# Patient Record
Sex: MALE | Race: OTHER | HISPANIC OR LATINO | URBAN - METROPOLITAN AREA
[De-identification: names, ages, dates, MRNs, and addresses within clinical notes are randomized per-mention and may not be internally consistent; named-entity substitution may affect disease eponyms.]

---

## 2023-01-01 ENCOUNTER — EMERGENCY (EMERGENCY)
Facility: HOSPITAL | Age: 88
LOS: 1 days | Discharge: HOME | End: 2023-01-01
Attending: EMERGENCY MEDICINE
Payer: MEDICARE

## 2023-01-01 VITALS
RESPIRATION RATE: 18 BRPM | DIASTOLIC BLOOD PRESSURE: 71 MMHG | TEMPERATURE: 98 F | SYSTOLIC BLOOD PRESSURE: 123 MMHG | HEART RATE: 56 BPM | OXYGEN SATURATION: 97 %

## 2023-01-01 VITALS
OXYGEN SATURATION: 96 % | DIASTOLIC BLOOD PRESSURE: 80 MMHG | HEIGHT: 68 IN | RESPIRATION RATE: 20 BRPM | WEIGHT: 169.98 LBS | HEART RATE: 96 BPM | SYSTOLIC BLOOD PRESSURE: 159 MMHG | TEMPERATURE: 96 F

## 2023-01-01 DIAGNOSIS — Z98.890 OTHER SPECIFIED POSTPROCEDURAL STATES: Chronic | ICD-10-CM

## 2023-01-01 DIAGNOSIS — Z95.0 PRESENCE OF CARDIAC PACEMAKER: Chronic | ICD-10-CM

## 2023-01-01 DIAGNOSIS — Z95.2 PRESENCE OF PROSTHETIC HEART VALVE: Chronic | ICD-10-CM

## 2023-01-01 LAB
ALBUMIN SERPL ELPH-MCNC: 3.6 G/DL — SIGNIFICANT CHANGE UP (ref 3.3–5)
ALP SERPL-CCNC: 217 U/L — HIGH (ref 40–120)
ALT FLD-CCNC: 41 U/L — SIGNIFICANT CHANGE UP (ref 10–45)
ANION GAP SERPL CALC-SCNC: 11 MMOL/L — SIGNIFICANT CHANGE UP (ref 5–17)
APTT BLD: 42.7 SEC — HIGH (ref 24.5–35.6)
AST SERPL-CCNC: 46 U/L — HIGH (ref 10–40)
BASOPHILS # BLD AUTO: 0.01 K/UL — SIGNIFICANT CHANGE UP (ref 0–0.2)
BASOPHILS NFR BLD AUTO: 0.1 % — SIGNIFICANT CHANGE UP (ref 0–2)
BILIRUB SERPL-MCNC: 0.9 MG/DL — SIGNIFICANT CHANGE UP (ref 0.2–1.2)
BUN SERPL-MCNC: 25 MG/DL — HIGH (ref 7–23)
CALCIUM SERPL-MCNC: 9 MG/DL — SIGNIFICANT CHANGE UP (ref 8.4–10.5)
CHLORIDE SERPL-SCNC: 103 MMOL/L — SIGNIFICANT CHANGE UP (ref 96–108)
CO2 SERPL-SCNC: 26 MMOL/L — SIGNIFICANT CHANGE UP (ref 22–31)
CREAT SERPL-MCNC: 1.05 MG/DL — SIGNIFICANT CHANGE UP (ref 0.5–1.3)
EGFR: 64 ML/MIN/1.73M2 — SIGNIFICANT CHANGE UP
EOSINOPHIL # BLD AUTO: 0.02 K/UL — SIGNIFICANT CHANGE UP (ref 0–0.5)
EOSINOPHIL NFR BLD AUTO: 0.3 % — SIGNIFICANT CHANGE UP (ref 0–6)
GLUCOSE SERPL-MCNC: 97 MG/DL — SIGNIFICANT CHANGE UP (ref 70–99)
HCT VFR BLD CALC: 41.8 % — SIGNIFICANT CHANGE UP (ref 39–50)
HGB BLD-MCNC: 13.6 G/DL — SIGNIFICANT CHANGE UP (ref 13–17)
IMM GRANULOCYTES NFR BLD AUTO: 1.2 % — HIGH (ref 0–0.9)
INR BLD: 2.98 RATIO — HIGH (ref 0.85–1.18)
LYMPHOCYTES # BLD AUTO: 0.64 K/UL — LOW (ref 1–3.3)
LYMPHOCYTES # BLD AUTO: 8.6 % — LOW (ref 13–44)
MCHC RBC-ENTMCNC: 30.7 PG — SIGNIFICANT CHANGE UP (ref 27–34)
MCHC RBC-ENTMCNC: 32.5 GM/DL — SIGNIFICANT CHANGE UP (ref 32–36)
MCV RBC AUTO: 94.4 FL — SIGNIFICANT CHANGE UP (ref 80–100)
MONOCYTES # BLD AUTO: 1.19 K/UL — HIGH (ref 0–0.9)
MONOCYTES NFR BLD AUTO: 16 % — HIGH (ref 2–14)
NEUTROPHILS # BLD AUTO: 5.48 K/UL — SIGNIFICANT CHANGE UP (ref 1.8–7.4)
NEUTROPHILS NFR BLD AUTO: 73.8 % — SIGNIFICANT CHANGE UP (ref 43–77)
NRBC # BLD: 0 /100 WBCS — SIGNIFICANT CHANGE UP (ref 0–0)
PLATELET # BLD AUTO: 329 K/UL — SIGNIFICANT CHANGE UP (ref 150–400)
POTASSIUM SERPL-MCNC: 5 MMOL/L — SIGNIFICANT CHANGE UP (ref 3.5–5.3)
POTASSIUM SERPL-SCNC: 5 MMOL/L — SIGNIFICANT CHANGE UP (ref 3.5–5.3)
PROT SERPL-MCNC: 6.2 G/DL — SIGNIFICANT CHANGE UP (ref 6–8.3)
PROTHROM AB SERPL-ACNC: 31.8 SEC — HIGH (ref 9.5–13)
RBC # BLD: 4.43 M/UL — SIGNIFICANT CHANGE UP (ref 4.2–5.8)
RBC # FLD: 15 % — HIGH (ref 10.3–14.5)
SODIUM SERPL-SCNC: 140 MMOL/L — SIGNIFICANT CHANGE UP (ref 135–145)
WBC # BLD: 7.43 K/UL — SIGNIFICANT CHANGE UP (ref 3.8–10.5)
WBC # FLD AUTO: 7.43 K/UL — SIGNIFICANT CHANGE UP (ref 3.8–10.5)

## 2023-01-01 PROCEDURE — 85730 THROMBOPLASTIN TIME PARTIAL: CPT

## 2023-01-01 PROCEDURE — 71045 X-RAY EXAM CHEST 1 VIEW: CPT | Mod: 26

## 2023-01-01 PROCEDURE — 99284 EMERGENCY DEPT VISIT MOD MDM: CPT | Mod: GC

## 2023-01-01 PROCEDURE — 71045 X-RAY EXAM CHEST 1 VIEW: CPT

## 2023-01-01 PROCEDURE — 72170 X-RAY EXAM OF PELVIS: CPT | Mod: 26

## 2023-01-01 PROCEDURE — 73030 X-RAY EXAM OF SHOULDER: CPT

## 2023-01-01 PROCEDURE — 85025 COMPLETE CBC W/AUTO DIFF WBC: CPT

## 2023-01-01 PROCEDURE — 70450 CT HEAD/BRAIN W/O DYE: CPT | Mod: 26,MA

## 2023-01-01 PROCEDURE — 72125 CT NECK SPINE W/O DYE: CPT | Mod: MA

## 2023-01-01 PROCEDURE — 96374 THER/PROPH/DIAG INJ IV PUSH: CPT

## 2023-01-01 PROCEDURE — 80053 COMPREHEN METABOLIC PANEL: CPT

## 2023-01-01 PROCEDURE — 85610 PROTHROMBIN TIME: CPT

## 2023-01-01 PROCEDURE — 72170 X-RAY EXAM OF PELVIS: CPT

## 2023-01-01 PROCEDURE — 73030 X-RAY EXAM OF SHOULDER: CPT | Mod: 26,50

## 2023-01-01 PROCEDURE — 99284 EMERGENCY DEPT VISIT MOD MDM: CPT | Mod: 25

## 2023-01-01 PROCEDURE — 70450 CT HEAD/BRAIN W/O DYE: CPT | Mod: MA

## 2023-01-01 PROCEDURE — 72125 CT NECK SPINE W/O DYE: CPT | Mod: 26,MA

## 2023-01-01 RX ORDER — ACETAMINOPHEN 500 MG
1000 TABLET ORAL ONCE
Refills: 0 | Status: COMPLETED | OUTPATIENT
Start: 2023-01-01 | End: 2023-01-01

## 2023-01-01 RX ADMIN — Medication 400 MILLIGRAM(S): at 20:30

## 2023-10-09 NOTE — ED ADULT NURSE NOTE - NSICDXPASTMEDICALHX_GEN_ALL_CORE_FT
PAST MEDICAL HISTORY:  Afib     BPH (benign prostatic hyperplasia)     Hernia, inguinal     HTN (hypertension)

## 2023-10-09 NOTE — ED ADULT NURSE NOTE - OBJECTIVE STATEMENT
89y M A&ox3 BIBEMS for multiple falls. As per EM , pt has an unsteady gait at baseline and ambulates with a walker or cane. Pt was getting up from a commode and had an unwitnessed fall backwards hitting his shoulder and buttocks around 430pm. Negative LOC. Son at bedside states after using the commode he leaned forward to  a napkin from the floor and fell backwards against the wall sliding down to the floor on his buttocks. Upon assessment pt has no bleeding, abrasions, deformities on back or extremities. Gross neuro with pulse motor and sensory intact. P/T C/o of B/L collar bone and buttock pain. Small ecchymosis under right eye and right chest wall ecchymosis from previous unrelated injury. Denies any LOC, Ha, dizziness, Fever/cough/N/V/D/Cp/SOB/Gi/Gu symptoms. PMH of HTN, BPH, Inguinal hernia and Afib. PSH Screw to left hip, Pacemaker placement and TAVR. VSS

## 2023-10-09 NOTE — ED PROVIDER NOTE - WR ORDER NAME 1
no abdominal pain, no constipation, no diarrhea, no nausea and no vomiting. Xray Shoulder 2 Views, Bilateral

## 2023-10-09 NOTE — ED PROVIDER NOTE - PHYSICAL EXAMINATION
PHYSICAL EXAM:  GENERAL: Sitting comfortable in bed, in no acute distress  HENMT: Atraumatic, moist mucous membranes EYES: Clear bilaterally, PERRL, EOMs intact b/l  HEART: Regular rate and regular rhythm, S1/S2, no murmur  RESPIRATORY: Clear to auscultation bilaterally, no wheezes/rhonchi/rales  ABDOMEN: Soft, nontender, nondistended  MSK: No spinal or paraspinal ttp, no chest wall ttp, pelvis stable  EXTREMITIES: LUE ROM limited 2/2 pain, ttp over the L anterior shoulder, +2 radial pulses b/l  NEURO: Alert, no focal neuor deficits   SKIN: Ecchymosis over the right anterior chest wall

## 2023-10-09 NOTE — ED PROVIDER NOTE - NSFOLLOWUPINSTRUCTIONS_ED_ALL_ED_FT
You have been evaluated in the Emergency Department today for shoulder pain after a fall. Your evaluation did not show evidence of medical conditions requiring emergent intervention at this time.    Please schedule an appointment with your primary care physician within 2-3 days.    For pain, you can take TYLENOL/ACETAMINOPHEN up to 4,000mg a day for your symptoms in four divided doses.     Return to the Emergency Department if you experience worsening or uncontrolled pain, or any other concerning symptoms.    Thank you for choosing us for your care.

## 2023-10-09 NOTE — ED ADULT NURSE NOTE - NSICDXPASTSURGICALHX_GEN_ALL_CORE_FT
PAST SURGICAL HISTORY:  History of repair of left hip joint     History of transcatheter aortic valve replacement (TAVR)     Pacemaker

## 2023-10-09 NOTE — ED PROVIDER NOTE - CLINICAL SUMMARY MEDICAL DECISION MAKING FREE TEXT BOX
89M PMH afib on warfarin, cardiac pacemaker, TAVR, dementia presenting with left shoulder pain after mechanical fall at 4pm. Will r/u ICH and fx. Plan: blood work, CT head, xrays of chest, b/l shoudlers and pelvis, pain control. Will re-assess. Ronni MIRANDA if w/u unremarkable. 89M PMH afib on warfarin, cardiac pacemaker, TAVR, dementia presenting with left shoulder pain after mechanical fall at 4pm. Will r/u ICH and fx. Plan: blood work, CT head, xrays of chest, b/l shoudlers and pelvis, pain control. Will re-assess. Ronni MIRANDA if w/u unremarkable. ZR 89M PMH afib on warfarin, cardiac pacemaker, TAVR, dementia presenting with left shoulder pain after mechanical fall at 4pm. Will r/u ICH and fx. Plan: blood work, CT head, xrays of chest, b/l shoulders and pelvis, pain control. Will re-assess. Likely DC if w/u unremarkable. ZR

## 2023-10-09 NOTE — ED PROVIDER NOTE - OBJECTIVE STATEMENT
89M PMH afib on warfarin, cardiac pacemaker, TAVR, dementia presenting with left shoulder pain after mechanical fall at 4pm. Son at bedside for collateral. Pt says that he was using the urinal when he lost his balance and fell backwards against the wall, slid onto his bottom and then fell onto his left shoulder. Denies HS or LOC. Had a similar fall on Saturday hitting his right shoulder. Was able to ambulate today with his walker after the fall. Denies HA, neck pain, CP, SOB, abdominal pain, urinary symptoms.

## 2023-10-09 NOTE — ED PROVIDER NOTE - PROGRESS NOTE DETAILS
Steph Santiago M.D. (Resident Physician): Imaging and blood work unremarkable. Will dc with pmd f/u.

## 2023-10-09 NOTE — ED ADULT NURSE NOTE - NSFALLHARMRISKINTERV_ED_ALL_ED

## 2023-10-09 NOTE — ED ADULT TRIAGE NOTE - NS ED NURSE AMBULANCES
[facility]
Patient informed/Family informed/Explanation of wait/Warm blanket/Pillow/Darkened room/TV

## 2023-10-09 NOTE — ED PROVIDER NOTE - PATIENT PORTAL LINK FT
You can access the FollowMyHealth Patient Portal offered by [facility] by registering at the following website: [email or web address]. By joining [employer]’s FollowMyHealth portal, you will also be able to view your health information using other applications (apps) compatible with our system.

## 2024-01-01 ENCOUNTER — INPATIENT (INPATIENT)
Facility: HOSPITAL | Age: 89
LOS: 3 days | DRG: 177 | End: 2024-01-23
Attending: STUDENT IN AN ORGANIZED HEALTH CARE EDUCATION/TRAINING PROGRAM | Admitting: STUDENT IN AN ORGANIZED HEALTH CARE EDUCATION/TRAINING PROGRAM
Payer: MEDICARE

## 2024-01-01 VITALS
HEART RATE: 69 BPM | OXYGEN SATURATION: 97 % | DIASTOLIC BLOOD PRESSURE: 61 MMHG | TEMPERATURE: 98 F | RESPIRATION RATE: 18 BRPM | SYSTOLIC BLOOD PRESSURE: 108 MMHG

## 2024-01-01 VITALS
RESPIRATION RATE: 18 BRPM | DIASTOLIC BLOOD PRESSURE: 74 MMHG | WEIGHT: 125 LBS | SYSTOLIC BLOOD PRESSURE: 124 MMHG | OXYGEN SATURATION: 89 % | HEIGHT: 64 IN | TEMPERATURE: 98 F | HEART RATE: 82 BPM

## 2024-01-01 DIAGNOSIS — J18.9 PNEUMONIA, UNSPECIFIED ORGANISM: ICD-10-CM

## 2024-01-01 DIAGNOSIS — Z51.5 ENCOUNTER FOR PALLIATIVE CARE: ICD-10-CM

## 2024-01-01 DIAGNOSIS — F03.C0 UNSPECIFIED DEMENTIA, SEVERE, WITHOUT BEHAVIORAL DISTURBANCE, PSYCHOTIC DISTURBANCE, MOOD DISTURBANCE, AND ANXIETY: ICD-10-CM

## 2024-01-01 DIAGNOSIS — N17.9 ACUTE KIDNEY FAILURE, UNSPECIFIED: ICD-10-CM

## 2024-01-01 DIAGNOSIS — E46 UNSPECIFIED PROTEIN-CALORIE MALNUTRITION: ICD-10-CM

## 2024-01-01 DIAGNOSIS — Z95.2 PRESENCE OF PROSTHETIC HEART VALVE: Chronic | ICD-10-CM

## 2024-01-01 DIAGNOSIS — L89.90 PRESSURE ULCER OF UNSPECIFIED SITE, UNSPECIFIED STAGE: ICD-10-CM

## 2024-01-01 DIAGNOSIS — I48.20 CHRONIC ATRIAL FIBRILLATION, UNSPECIFIED: ICD-10-CM

## 2024-01-01 DIAGNOSIS — E87.6 HYPOKALEMIA: ICD-10-CM

## 2024-01-01 DIAGNOSIS — R53.81 OTHER MALAISE: ICD-10-CM

## 2024-01-01 DIAGNOSIS — J96.01 ACUTE RESPIRATORY FAILURE WITH HYPOXIA: ICD-10-CM

## 2024-01-01 DIAGNOSIS — R79.1 ABNORMAL COAGULATION PROFILE: ICD-10-CM

## 2024-01-01 DIAGNOSIS — Z29.9 ENCOUNTER FOR PROPHYLACTIC MEASURES, UNSPECIFIED: ICD-10-CM

## 2024-01-01 DIAGNOSIS — Z95.0 PRESENCE OF CARDIAC PACEMAKER: Chronic | ICD-10-CM

## 2024-01-01 DIAGNOSIS — Z98.890 OTHER SPECIFIED POSTPROCEDURAL STATES: Chronic | ICD-10-CM

## 2024-01-01 DIAGNOSIS — E43 UNSPECIFIED SEVERE PROTEIN-CALORIE MALNUTRITION: ICD-10-CM

## 2024-01-01 DIAGNOSIS — R53.2 FUNCTIONAL QUADRIPLEGIA: ICD-10-CM

## 2024-01-01 DIAGNOSIS — F03.90 UNSPECIFIED DEMENTIA, UNSPECIFIED SEVERITY, WITHOUT BEHAVIORAL DISTURBANCE, PSYCHOTIC DISTURBANCE, MOOD DISTURBANCE, AND ANXIETY: ICD-10-CM

## 2024-01-01 DIAGNOSIS — G93.40 ENCEPHALOPATHY, UNSPECIFIED: ICD-10-CM

## 2024-01-01 LAB
ALBUMIN SERPL ELPH-MCNC: 2.3 G/DL — LOW (ref 3.5–5)
ALBUMIN SERPL ELPH-MCNC: 2.6 G/DL — LOW (ref 3.5–5)
ALP SERPL-CCNC: 158 U/L — HIGH (ref 40–120)
ALP SERPL-CCNC: 177 U/L — HIGH (ref 40–120)
ALT FLD-CCNC: 27 U/L DA — SIGNIFICANT CHANGE UP (ref 10–60)
ALT FLD-CCNC: 31 U/L DA — SIGNIFICANT CHANGE UP (ref 10–60)
ANION GAP SERPL CALC-SCNC: 3 MMOL/L — LOW (ref 5–17)
ANION GAP SERPL CALC-SCNC: 4 MMOL/L — LOW (ref 5–17)
ANION GAP SERPL CALC-SCNC: 5 MMOL/L — SIGNIFICANT CHANGE UP (ref 5–17)
ANION GAP SERPL CALC-SCNC: 6 MMOL/L — SIGNIFICANT CHANGE UP (ref 5–17)
APPEARANCE UR: CLEAR — SIGNIFICANT CHANGE UP
APTT BLD: 72.5 SEC — HIGH (ref 24.5–35.6)
APTT BLD: 73.7 SEC — HIGH (ref 24.5–35.6)
AST SERPL-CCNC: 32 U/L — SIGNIFICANT CHANGE UP (ref 10–40)
AST SERPL-CCNC: 35 U/L — SIGNIFICANT CHANGE UP (ref 10–40)
BACTERIA # UR AUTO: NEGATIVE /HPF — SIGNIFICANT CHANGE UP
BASOPHILS # BLD AUTO: 0.01 K/UL — SIGNIFICANT CHANGE UP (ref 0–0.2)
BASOPHILS NFR BLD AUTO: 0.2 % — SIGNIFICANT CHANGE UP (ref 0–2)
BILIRUB SERPL-MCNC: 0.6 MG/DL — SIGNIFICANT CHANGE UP (ref 0.2–1.2)
BILIRUB SERPL-MCNC: 0.7 MG/DL — SIGNIFICANT CHANGE UP (ref 0.2–1.2)
BILIRUB UR-MCNC: NEGATIVE — SIGNIFICANT CHANGE UP
BUN SERPL-MCNC: 31 MG/DL — HIGH (ref 7–18)
BUN SERPL-MCNC: 34 MG/DL — HIGH (ref 7–18)
BUN SERPL-MCNC: 36 MG/DL — HIGH (ref 7–18)
BUN SERPL-MCNC: 37 MG/DL — HIGH (ref 7–18)
CALCIUM SERPL-MCNC: 8.3 MG/DL — LOW (ref 8.4–10.5)
CALCIUM SERPL-MCNC: 8.8 MG/DL — SIGNIFICANT CHANGE UP (ref 8.4–10.5)
CALCIUM SERPL-MCNC: 8.9 MG/DL — SIGNIFICANT CHANGE UP (ref 8.4–10.5)
CALCIUM SERPL-MCNC: 9.3 MG/DL — SIGNIFICANT CHANGE UP (ref 8.4–10.5)
CHLORIDE SERPL-SCNC: 109 MMOL/L — HIGH (ref 96–108)
CHLORIDE SERPL-SCNC: 112 MMOL/L — HIGH (ref 96–108)
CHLORIDE SERPL-SCNC: 117 MMOL/L — HIGH (ref 96–108)
CHLORIDE SERPL-SCNC: 118 MMOL/L — HIGH (ref 96–108)
CO2 SERPL-SCNC: 24 MMOL/L — SIGNIFICANT CHANGE UP (ref 22–31)
CO2 SERPL-SCNC: 25 MMOL/L — SIGNIFICANT CHANGE UP (ref 22–31)
CO2 SERPL-SCNC: 29 MMOL/L — SIGNIFICANT CHANGE UP (ref 22–31)
CO2 SERPL-SCNC: 32 MMOL/L — HIGH (ref 22–31)
COLOR SPEC: YELLOW — SIGNIFICANT CHANGE UP
CREAT SERPL-MCNC: 1.09 MG/DL — SIGNIFICANT CHANGE UP (ref 0.5–1.3)
CREAT SERPL-MCNC: 1.15 MG/DL — SIGNIFICANT CHANGE UP (ref 0.5–1.3)
CREAT SERPL-MCNC: 1.25 MG/DL — SIGNIFICANT CHANGE UP (ref 0.5–1.3)
CREAT SERPL-MCNC: 1.3 MG/DL — SIGNIFICANT CHANGE UP (ref 0.5–1.3)
CULTURE RESULTS: SIGNIFICANT CHANGE UP
DIFF PNL FLD: ABNORMAL
EGFR: 50 ML/MIN/1.73M2 — LOW
EGFR: 52 ML/MIN/1.73M2 — LOW
EGFR: 58 ML/MIN/1.73M2 — LOW
EGFR: 61 ML/MIN/1.73M2 — SIGNIFICANT CHANGE UP
EOSINOPHIL # BLD AUTO: 0.08 K/UL — SIGNIFICANT CHANGE UP (ref 0–0.5)
EOSINOPHIL NFR BLD AUTO: 1.2 % — SIGNIFICANT CHANGE UP (ref 0–6)
EPI CELLS # UR: PRESENT
GLUCOSE BLDC GLUCOMTR-MCNC: 110 MG/DL — HIGH (ref 70–99)
GLUCOSE BLDC GLUCOMTR-MCNC: 123 MG/DL — HIGH (ref 70–99)
GLUCOSE BLDC GLUCOMTR-MCNC: 124 MG/DL — HIGH (ref 70–99)
GLUCOSE BLDC GLUCOMTR-MCNC: 40 MG/DL — CRITICAL LOW (ref 70–99)
GLUCOSE SERPL-MCNC: 106 MG/DL — HIGH (ref 70–99)
GLUCOSE SERPL-MCNC: 125 MG/DL — HIGH (ref 70–99)
GLUCOSE SERPL-MCNC: 46 MG/DL — CRITICAL LOW (ref 70–99)
GLUCOSE SERPL-MCNC: 77 MG/DL — SIGNIFICANT CHANGE UP (ref 70–99)
GLUCOSE UR QL: NEGATIVE MG/DL — SIGNIFICANT CHANGE UP
HCT VFR BLD CALC: 32.3 % — LOW (ref 39–50)
HCT VFR BLD CALC: 37.3 % — LOW (ref 39–50)
HCT VFR BLD CALC: 37.3 % — LOW (ref 39–50)
HCT VFR BLD CALC: 38.1 % — LOW (ref 39–50)
HGB BLD-MCNC: 10.4 G/DL — LOW (ref 13–17)
HGB BLD-MCNC: 11.6 G/DL — LOW (ref 13–17)
HGB BLD-MCNC: 11.8 G/DL — LOW (ref 13–17)
HGB BLD-MCNC: 12.2 G/DL — LOW (ref 13–17)
HYALINE CASTS # UR AUTO: PRESENT
IMM GRANULOCYTES NFR BLD AUTO: 0.5 % — SIGNIFICANT CHANGE UP (ref 0–0.9)
INR BLD: 1.27 RATIO — HIGH (ref 0.85–1.18)
INR BLD: 1.39 RATIO — HIGH (ref 0.85–1.18)
INR BLD: 6.99 RATIO — CRITICAL HIGH (ref 0.85–1.18)
INR BLD: 7.13 RATIO — CRITICAL HIGH (ref 0.85–1.18)
KETONES UR-MCNC: NEGATIVE MG/DL — SIGNIFICANT CHANGE UP
LACTATE SERPL-SCNC: 1.4 MMOL/L — SIGNIFICANT CHANGE UP (ref 0.7–2)
LACTATE SERPL-SCNC: 2.3 MMOL/L — HIGH (ref 0.7–2)
LEGIONELLA AG UR QL: NEGATIVE — SIGNIFICANT CHANGE UP
LEUKOCYTE ESTERASE UR-ACNC: NEGATIVE — SIGNIFICANT CHANGE UP
LYMPHOCYTES # BLD AUTO: 0.62 K/UL — LOW (ref 1–3.3)
LYMPHOCYTES # BLD AUTO: 9.6 % — LOW (ref 13–44)
M PNEUMO IGM SER-ACNC: 0.26 INDEX — SIGNIFICANT CHANGE UP (ref 0–0.9)
MAGNESIUM SERPL-MCNC: 1.9 MG/DL — SIGNIFICANT CHANGE UP (ref 1.6–2.6)
MCHC RBC-ENTMCNC: 29.7 PG — SIGNIFICANT CHANGE UP (ref 27–34)
MCHC RBC-ENTMCNC: 29.8 PG — SIGNIFICANT CHANGE UP (ref 27–34)
MCHC RBC-ENTMCNC: 30 PG — SIGNIFICANT CHANGE UP (ref 27–34)
MCHC RBC-ENTMCNC: 30.1 PG — SIGNIFICANT CHANGE UP (ref 27–34)
MCHC RBC-ENTMCNC: 31.1 GM/DL — LOW (ref 32–36)
MCHC RBC-ENTMCNC: 31.6 GM/DL — LOW (ref 32–36)
MCHC RBC-ENTMCNC: 32 GM/DL — SIGNIFICANT CHANGE UP (ref 32–36)
MCHC RBC-ENTMCNC: 32.2 GM/DL — SIGNIFICANT CHANGE UP (ref 32–36)
MCV RBC AUTO: 93.1 FL — SIGNIFICANT CHANGE UP (ref 80–100)
MCV RBC AUTO: 93.2 FL — SIGNIFICANT CHANGE UP (ref 80–100)
MCV RBC AUTO: 94 FL — SIGNIFICANT CHANGE UP (ref 80–100)
MCV RBC AUTO: 96.6 FL — SIGNIFICANT CHANGE UP (ref 80–100)
MONOCYTES # BLD AUTO: 0.69 K/UL — SIGNIFICANT CHANGE UP (ref 0–0.9)
MONOCYTES NFR BLD AUTO: 10.7 % — SIGNIFICANT CHANGE UP (ref 2–14)
MYCOPLASMA AG SPEC QL: NEGATIVE — SIGNIFICANT CHANGE UP
NEUTROPHILS # BLD AUTO: 5 K/UL — SIGNIFICANT CHANGE UP (ref 1.8–7.4)
NEUTROPHILS NFR BLD AUTO: 77.8 % — HIGH (ref 43–77)
NITRITE UR-MCNC: NEGATIVE — SIGNIFICANT CHANGE UP
NRBC # BLD: 0 /100 WBCS — SIGNIFICANT CHANGE UP (ref 0–0)
PH UR: 6 — SIGNIFICANT CHANGE UP (ref 5–8)
PHOSPHATE SERPL-MCNC: 3.2 MG/DL — SIGNIFICANT CHANGE UP (ref 2.5–4.5)
PLATELET # BLD AUTO: 210 K/UL — SIGNIFICANT CHANGE UP (ref 150–400)
PLATELET # BLD AUTO: 246 K/UL — SIGNIFICANT CHANGE UP (ref 150–400)
PLATELET # BLD AUTO: 251 K/UL — SIGNIFICANT CHANGE UP (ref 150–400)
PLATELET # BLD AUTO: 255 K/UL — SIGNIFICANT CHANGE UP (ref 150–400)
POTASSIUM SERPL-MCNC: 3.3 MMOL/L — LOW (ref 3.5–5.3)
POTASSIUM SERPL-MCNC: 3.7 MMOL/L — SIGNIFICANT CHANGE UP (ref 3.5–5.3)
POTASSIUM SERPL-MCNC: 3.8 MMOL/L — SIGNIFICANT CHANGE UP (ref 3.5–5.3)
POTASSIUM SERPL-MCNC: 4.6 MMOL/L — SIGNIFICANT CHANGE UP (ref 3.5–5.3)
POTASSIUM SERPL-SCNC: 3.3 MMOL/L — LOW (ref 3.5–5.3)
POTASSIUM SERPL-SCNC: 3.7 MMOL/L — SIGNIFICANT CHANGE UP (ref 3.5–5.3)
POTASSIUM SERPL-SCNC: 3.8 MMOL/L — SIGNIFICANT CHANGE UP (ref 3.5–5.3)
POTASSIUM SERPL-SCNC: 4.6 MMOL/L — SIGNIFICANT CHANGE UP (ref 3.5–5.3)
PROT SERPL-MCNC: 5.9 G/DL — LOW (ref 6–8.3)
PROT SERPL-MCNC: 6.5 G/DL — SIGNIFICANT CHANGE UP (ref 6–8.3)
PROT UR-MCNC: NEGATIVE MG/DL — SIGNIFICANT CHANGE UP
PROTHROM AB SERPL-ACNC: 14.4 SEC — HIGH (ref 9.5–13)
PROTHROM AB SERPL-ACNC: 15.7 SEC — HIGH (ref 9.5–13)
PROTHROM AB SERPL-ACNC: 75.3 SEC — HIGH (ref 9.5–13)
PROTHROM AB SERPL-ACNC: 76.8 SEC — HIGH (ref 9.5–13)
RAPID RVP RESULT: SIGNIFICANT CHANGE UP
RBC # BLD: 3.47 M/UL — LOW (ref 4.2–5.8)
RBC # BLD: 3.86 M/UL — LOW (ref 4.2–5.8)
RBC # BLD: 3.97 M/UL — LOW (ref 4.2–5.8)
RBC # BLD: 4.09 M/UL — LOW (ref 4.2–5.8)
RBC # FLD: 14.5 % — SIGNIFICANT CHANGE UP (ref 10.3–14.5)
RBC # FLD: 14.6 % — HIGH (ref 10.3–14.5)
RBC CASTS # UR COMP ASSIST: 11 /HPF — HIGH (ref 0–4)
S PNEUM AG UR QL: NEGATIVE — SIGNIFICANT CHANGE UP
SARS-COV-2 RNA SPEC QL NAA+PROBE: SIGNIFICANT CHANGE UP
SODIUM SERPL-SCNC: 144 MMOL/L — SIGNIFICANT CHANGE UP (ref 135–145)
SODIUM SERPL-SCNC: 145 MMOL/L — SIGNIFICANT CHANGE UP (ref 135–145)
SODIUM SERPL-SCNC: 147 MMOL/L — HIGH (ref 135–145)
SODIUM SERPL-SCNC: 148 MMOL/L — HIGH (ref 135–145)
SP GR SPEC: 1.01 — SIGNIFICANT CHANGE UP (ref 1–1.03)
SPECIMEN SOURCE: SIGNIFICANT CHANGE UP
TROPONIN I, HIGH SENSITIVITY RESULT: 33.2 NG/L — SIGNIFICANT CHANGE UP
UROBILINOGEN FLD QL: 1 MG/DL — SIGNIFICANT CHANGE UP (ref 0.2–1)
WBC # BLD: 4.74 K/UL — SIGNIFICANT CHANGE UP (ref 3.8–10.5)
WBC # BLD: 5.34 K/UL — SIGNIFICANT CHANGE UP (ref 3.8–10.5)
WBC # BLD: 6.43 K/UL — SIGNIFICANT CHANGE UP (ref 3.8–10.5)
WBC # BLD: 6.44 K/UL — SIGNIFICANT CHANGE UP (ref 3.8–10.5)
WBC # FLD AUTO: 4.74 K/UL — SIGNIFICANT CHANGE UP (ref 3.8–10.5)
WBC # FLD AUTO: 5.34 K/UL — SIGNIFICANT CHANGE UP (ref 3.8–10.5)
WBC # FLD AUTO: 6.43 K/UL — SIGNIFICANT CHANGE UP (ref 3.8–10.5)
WBC # FLD AUTO: 6.44 K/UL — SIGNIFICANT CHANGE UP (ref 3.8–10.5)
WBC UR QL: 4 /HPF — SIGNIFICANT CHANGE UP (ref 0–5)

## 2024-01-01 PROCEDURE — 87899 AGENT NOS ASSAY W/OPTIC: CPT

## 2024-01-01 PROCEDURE — 83605 ASSAY OF LACTIC ACID: CPT

## 2024-01-01 PROCEDURE — 74177 CT ABD & PELVIS W/CONTRAST: CPT | Mod: 26

## 2024-01-01 PROCEDURE — 80048 BASIC METABOLIC PNL TOTAL CA: CPT

## 2024-01-01 PROCEDURE — 85027 COMPLETE CBC AUTOMATED: CPT

## 2024-01-01 PROCEDURE — 85025 COMPLETE CBC W/AUTO DIFF WBC: CPT

## 2024-01-01 PROCEDURE — 81001 URINALYSIS AUTO W/SCOPE: CPT

## 2024-01-01 PROCEDURE — 70450 CT HEAD/BRAIN W/O DYE: CPT | Mod: 26

## 2024-01-01 PROCEDURE — 85730 THROMBOPLASTIN TIME PARTIAL: CPT

## 2024-01-01 PROCEDURE — 80053 COMPREHEN METABOLIC PANEL: CPT

## 2024-01-01 PROCEDURE — 93005 ELECTROCARDIOGRAM TRACING: CPT

## 2024-01-01 PROCEDURE — 99497 ADVNCD CARE PLAN 30 MIN: CPT | Mod: 25,GC

## 2024-01-01 PROCEDURE — 83735 ASSAY OF MAGNESIUM: CPT

## 2024-01-01 PROCEDURE — 0225U NFCT DS DNA&RNA 21 SARSCOV2: CPT

## 2024-01-01 PROCEDURE — 99223 1ST HOSP IP/OBS HIGH 75: CPT

## 2024-01-01 PROCEDURE — 87449 NOS EACH ORGANISM AG IA: CPT

## 2024-01-01 PROCEDURE — 92610 EVALUATE SWALLOWING FUNCTION: CPT

## 2024-01-01 PROCEDURE — 99497 ADVNCD CARE PLAN 30 MIN: CPT | Mod: 25

## 2024-01-01 PROCEDURE — 85610 PROTHROMBIN TIME: CPT

## 2024-01-01 PROCEDURE — 71045 X-RAY EXAM CHEST 1 VIEW: CPT | Mod: 26

## 2024-01-01 PROCEDURE — 84484 ASSAY OF TROPONIN QUANT: CPT

## 2024-01-01 PROCEDURE — 99223 1ST HOSP IP/OBS HIGH 75: CPT | Mod: GC

## 2024-01-01 PROCEDURE — 99233 SBSQ HOSP IP/OBS HIGH 50: CPT | Mod: FS

## 2024-01-01 PROCEDURE — 71045 X-RAY EXAM CHEST 1 VIEW: CPT

## 2024-01-01 PROCEDURE — 86738 MYCOPLASMA ANTIBODY: CPT

## 2024-01-01 PROCEDURE — 82962 GLUCOSE BLOOD TEST: CPT

## 2024-01-01 PROCEDURE — 74177 CT ABD & PELVIS W/CONTRAST: CPT | Mod: MA

## 2024-01-01 PROCEDURE — 84100 ASSAY OF PHOSPHORUS: CPT

## 2024-01-01 PROCEDURE — 99233 SBSQ HOSP IP/OBS HIGH 50: CPT

## 2024-01-01 PROCEDURE — 70450 CT HEAD/BRAIN W/O DYE: CPT | Mod: MA

## 2024-01-01 PROCEDURE — 36415 COLL VENOUS BLD VENIPUNCTURE: CPT

## 2024-01-01 PROCEDURE — 87086 URINE CULTURE/COLONY COUNT: CPT

## 2024-01-01 PROCEDURE — 99285 EMERGENCY DEPT VISIT HI MDM: CPT

## 2024-01-01 PROCEDURE — 87040 BLOOD CULTURE FOR BACTERIA: CPT

## 2024-01-01 RX ORDER — METOPROLOL TARTRATE 50 MG
1 TABLET ORAL
Refills: 0 | DISCHARGE

## 2024-01-01 RX ORDER — CEFTRIAXONE 500 MG/1
1000 INJECTION, POWDER, FOR SOLUTION INTRAMUSCULAR; INTRAVENOUS ONCE
Refills: 0 | Status: COMPLETED | OUTPATIENT
Start: 2024-01-01 | End: 2024-01-01

## 2024-01-01 RX ORDER — CEFTRIAXONE 500 MG/1
1000 INJECTION, POWDER, FOR SOLUTION INTRAMUSCULAR; INTRAVENOUS EVERY 24 HOURS
Refills: 0 | Status: DISCONTINUED | OUTPATIENT
Start: 2024-01-01 | End: 2024-01-01

## 2024-01-01 RX ORDER — SODIUM CHLORIDE 9 MG/ML
1000 INJECTION INTRAMUSCULAR; INTRAVENOUS; SUBCUTANEOUS
Refills: 0 | Status: DISCONTINUED | OUTPATIENT
Start: 2024-01-01 | End: 2024-01-01

## 2024-01-01 RX ORDER — ENOXAPARIN SODIUM 100 MG/ML
60 INJECTION SUBCUTANEOUS EVERY 12 HOURS
Refills: 0 | Status: DISCONTINUED | OUTPATIENT
Start: 2024-01-01 | End: 2024-01-01

## 2024-01-01 RX ORDER — WARFARIN SODIUM 2.5 MG/1
1 TABLET ORAL
Refills: 0 | DISCHARGE

## 2024-01-01 RX ORDER — ONDANSETRON 8 MG/1
4 TABLET, FILM COATED ORAL EVERY 8 HOURS
Refills: 0 | Status: DISCONTINUED | OUTPATIENT
Start: 2024-01-01 | End: 2024-01-01

## 2024-01-01 RX ORDER — SODIUM CHLORIDE 9 MG/ML
1000 INJECTION, SOLUTION INTRAVENOUS
Refills: 0 | Status: DISCONTINUED | OUTPATIENT
Start: 2024-01-01 | End: 2024-01-01

## 2024-01-01 RX ORDER — DEXTROSE 50 % IN WATER 50 %
25 SYRINGE (ML) INTRAVENOUS ONCE
Refills: 0 | Status: DISCONTINUED | OUTPATIENT
Start: 2024-01-01 | End: 2024-01-01

## 2024-01-01 RX ORDER — PHYTONADIONE (VIT K1) 5 MG
2.5 TABLET ORAL ONCE
Refills: 0 | Status: COMPLETED | OUTPATIENT
Start: 2024-01-01 | End: 2024-01-01

## 2024-01-01 RX ORDER — SODIUM CHLORIDE 9 MG/ML
1750 INJECTION INTRAMUSCULAR; INTRAVENOUS; SUBCUTANEOUS ONCE
Refills: 0 | Status: COMPLETED | OUTPATIENT
Start: 2024-01-01 | End: 2024-01-01

## 2024-01-01 RX ORDER — HALOPERIDOL DECANOATE 100 MG/ML
2 INJECTION INTRAMUSCULAR ONCE
Refills: 0 | Status: COMPLETED | OUTPATIENT
Start: 2024-01-01 | End: 2024-01-01

## 2024-01-01 RX ORDER — TAMSULOSIN HYDROCHLORIDE 0.4 MG/1
1 CAPSULE ORAL
Refills: 0 | DISCHARGE

## 2024-01-01 RX ORDER — FUROSEMIDE 40 MG
1 TABLET ORAL
Refills: 0 | DISCHARGE

## 2024-01-01 RX ORDER — DEXTROSE 50 % IN WATER 50 %
25 SYRINGE (ML) INTRAVENOUS ONCE
Refills: 0 | Status: COMPLETED | OUTPATIENT
Start: 2024-01-01 | End: 2024-01-01

## 2024-01-01 RX ORDER — ACETAMINOPHEN 500 MG
650 TABLET ORAL EVERY 6 HOURS
Refills: 0 | Status: DISCONTINUED | OUTPATIENT
Start: 2024-01-01 | End: 2024-01-01

## 2024-01-01 RX ORDER — MORPHINE SULFATE 50 MG/1
2 CAPSULE, EXTENDED RELEASE ORAL ONCE
Refills: 0 | Status: DISCONTINUED | OUTPATIENT
Start: 2024-01-01 | End: 2024-01-01

## 2024-01-01 RX ORDER — POTASSIUM CHLORIDE 20 MEQ
10 PACKET (EA) ORAL
Refills: 0 | Status: COMPLETED | OUTPATIENT
Start: 2024-01-01 | End: 2024-01-01

## 2024-01-01 RX ORDER — PHYTONADIONE (VIT K1) 5 MG
5 TABLET ORAL ONCE
Refills: 0 | Status: COMPLETED | OUTPATIENT
Start: 2024-01-01 | End: 2024-01-01

## 2024-01-01 RX ORDER — LANOLIN ALCOHOL/MO/W.PET/CERES
3 CREAM (GRAM) TOPICAL AT BEDTIME
Refills: 0 | Status: DISCONTINUED | OUTPATIENT
Start: 2024-01-01 | End: 2024-01-01

## 2024-01-01 RX ORDER — AZITHROMYCIN 500 MG/1
500 TABLET, FILM COATED ORAL EVERY 24 HOURS
Refills: 0 | Status: DISCONTINUED | OUTPATIENT
Start: 2024-01-01 | End: 2024-01-01

## 2024-01-01 RX ORDER — TAMSULOSIN HYDROCHLORIDE 0.4 MG/1
0.4 CAPSULE ORAL AT BEDTIME
Refills: 0 | Status: DISCONTINUED | OUTPATIENT
Start: 2024-01-01 | End: 2024-01-01

## 2024-01-01 RX ADMIN — ENOXAPARIN SODIUM 60 MILLIGRAM(S): 100 INJECTION SUBCUTANEOUS at 17:44

## 2024-01-01 RX ADMIN — Medication 100 MILLIEQUIVALENT(S): at 13:06

## 2024-01-01 RX ADMIN — CEFTRIAXONE 100 MILLIGRAM(S): 500 INJECTION, POWDER, FOR SOLUTION INTRAMUSCULAR; INTRAVENOUS at 19:57

## 2024-01-01 RX ADMIN — SODIUM CHLORIDE 80 MILLILITER(S): 9 INJECTION, SOLUTION INTRAVENOUS at 00:30

## 2024-01-01 RX ADMIN — HALOPERIDOL DECANOATE 2 MILLIGRAM(S): 100 INJECTION INTRAMUSCULAR at 22:19

## 2024-01-01 RX ADMIN — SODIUM CHLORIDE 80 MILLILITER(S): 9 INJECTION, SOLUTION INTRAVENOUS at 11:41

## 2024-01-01 RX ADMIN — MORPHINE SULFATE 2 MILLIGRAM(S): 50 CAPSULE, EXTENDED RELEASE ORAL at 19:04

## 2024-01-01 RX ADMIN — Medication 100 MILLIEQUIVALENT(S): at 14:18

## 2024-01-01 RX ADMIN — CEFTRIAXONE 100 MILLIGRAM(S): 500 INJECTION, POWDER, FOR SOLUTION INTRAMUSCULAR; INTRAVENOUS at 19:00

## 2024-01-01 RX ADMIN — Medication 10 MILLIGRAM(S): at 17:44

## 2024-01-01 RX ADMIN — Medication 10 MILLIGRAM(S): at 17:46

## 2024-01-01 RX ADMIN — SODIUM CHLORIDE 80 MILLILITER(S): 9 INJECTION INTRAMUSCULAR; INTRAVENOUS; SUBCUTANEOUS at 13:42

## 2024-01-01 RX ADMIN — SODIUM CHLORIDE 1750 MILLILITER(S): 9 INJECTION INTRAMUSCULAR; INTRAVENOUS; SUBCUTANEOUS at 19:52

## 2024-01-01 RX ADMIN — AZITHROMYCIN 255 MILLIGRAM(S): 500 TABLET, FILM COATED ORAL at 04:34

## 2024-01-01 RX ADMIN — Medication 101 MILLIGRAM(S): at 09:20

## 2024-01-01 RX ADMIN — Medication 100 MILLIEQUIVALENT(S): at 11:41

## 2024-01-01 RX ADMIN — SODIUM CHLORIDE 80 MILLILITER(S): 9 INJECTION INTRAMUSCULAR; INTRAVENOUS; SUBCUTANEOUS at 19:57

## 2024-01-01 RX ADMIN — SODIUM CHLORIDE 80 MILLILITER(S): 9 INJECTION, SOLUTION INTRAVENOUS at 11:06

## 2024-01-01 RX ADMIN — Medication 1 MILLIGRAM(S): at 11:52

## 2024-01-01 RX ADMIN — CEFTRIAXONE 100 MILLIGRAM(S): 500 INJECTION, POWDER, FOR SOLUTION INTRAMUSCULAR; INTRAVENOUS at 19:34

## 2024-01-01 RX ADMIN — ENOXAPARIN SODIUM 60 MILLIGRAM(S): 100 INJECTION SUBCUTANEOUS at 05:29

## 2024-01-01 RX ADMIN — Medication 25 GRAM(S): at 10:35

## 2024-01-01 RX ADMIN — Medication 2.5 MILLIGRAM(S): at 03:43

## 2024-01-01 RX ADMIN — HALOPERIDOL DECANOATE 2 MILLIGRAM(S): 100 INJECTION INTRAMUSCULAR at 00:52

## 2024-01-01 RX ADMIN — CEFTRIAXONE 100 MILLIGRAM(S): 500 INJECTION, POWDER, FOR SOLUTION INTRAMUSCULAR; INTRAVENOUS at 19:52

## 2024-01-01 RX ADMIN — MORPHINE SULFATE 2 MILLIGRAM(S): 50 CAPSULE, EXTENDED RELEASE ORAL at 18:34

## 2024-01-01 RX ADMIN — AZITHROMYCIN 255 MILLIGRAM(S): 500 TABLET, FILM COATED ORAL at 03:56

## 2024-01-19 NOTE — H&P ADULT - ASSESSMENT
89M PMH Afib on coumadin, PPM, TAVR, dementia presenting with AMS for the past several days admitted for acute encephalopathy 2/2 PNA

## 2024-01-19 NOTE — H&P ADULT - PROBLEM SELECTOR PLAN 2
presenting with AMS  AAOx2-3 at home, AAOX0 on exam  CXR showing RLL consolidation  CT chest: multifocal PNA  98% on 4L NC on admission  RVP (-)  f/u strep, mycoplasma and legionella urine Ag  f/u sputum cultures  started on rocephin and azithro presenting with AMS  AAOx2-3 at home, AAOX0 on exam  CXR showing RLL consolidation  CT AP: multifocal PNA  98% on 4L NC on admission  RVP (-)  f/u strep, mycoplasma and legionella urine Ag  f/u sputum cultures  started on rocephin and azithro

## 2024-01-19 NOTE — ED ADULT NURSE NOTE - NSFALLUNIVINTERV_ED_ALL_ED
Bed/Stretcher in lowest position, wheels locked, appropriate side rails in place/Call bell, personal items and telephone in reach/Instruct patient to call for assistance before getting out of bed/chair/stretcher/Non-slip footwear applied when patient is off stretcher/[place] to call system/Physically safe environment - no spills, clutter or unnecessary equipment/Purposeful proactive rounding/Room/bathroom lighting operational, light cord in reach

## 2024-01-19 NOTE — H&P ADULT - CONVERSATION DETAILS
C discussion had with son, son and daughter who all take care of patient. Family report that patient with dementia but much more alert usually but has been getting less alert over the past 3 days. They had questions regarding hospice as patient has been getting harder to take care of at home. Patient has history of sundowning and delerium in the past. Discussed code status for all family agree on DNR/DNI but will like to continue medical care for now and would likely not want any intensive procedures or surgeries but would like to know first. Discussed possible hospice (home vs ltc) and comfort care and family will discuss and decide. For now agrees to medical care and DNR/DNI.

## 2024-01-19 NOTE — H&P ADULT - ATTENDING COMMENTS
89M, pmh of chronic afib on warfarin, cardiac pacemaker, TAVR, dementia, presenting with altered mental status. Patient with baseline dementia but patient is more alert at baseline. Currently A/Ox0, lethargic. Admit for AHRF and Encephalopathy 2/2 R PNA, and supratherapeutic INR.    #AHRF and Encephalopathy 2/2 R PNA, possible aspiration  #Supratherapeutic INR  #Dementia  #Chronic Afib, cardiac pacemaker, TAVR on warfarin  CXR showing RLL consolidation concerning for aspiration PNA, requiring 4L NC  - Ceftriaxone and azithromycin  - IVF hydration  - titrate down NC as tolerated  - f/u viral panel  - f/u UA results  - f/u cultures  - obtain sputum culture, strep, legionella, mycoplasma  - S/S eval  - Supratherapeutic INR 6.99, reportedly >7 in clinic 3 days ago and warfarin was held, no signs of bleeding   - Vit K, hold warfarin - monitor INR  - f/u CTH and CT A/P results  - S/S eval  - aspiration precautions  - NPO for now due to mental status  - DVT ppx    GOC discussion had with son, son and daughter who all take care of patient. Family report that patient with dementia but much more alert usually but has been getting less alert over the past 3 days. They had questions regarding hospice as patient has been getting harder to take care of at home. Patient has history of sundowning and delerium in the past. Discussed code status for all family agree on DNR/DNI but will like to continue medical care for now and would likely not want any intensive procedures or surgeries but would like to know first. Discussed possible hospice (home vs ltc) and comfort care and family will discuss and decide. For now agrees to medical care and DNR/DNI.

## 2024-01-19 NOTE — H&P ADULT - HISTORY OF PRESENT ILLNESS
89M PMH Afib on coumadin, PPM, TAVR, dementia presenting with AMS for the past several days. Family at bedside states that he lives with his son and ambulates with a walker. but he's noticed that the patient has not been moving around less and has been confused for the past few days. He has also been having a dry cough, chills, and has had incontinence. He was on coumadin for Afib, however his PCP told him to stop taking it due to elevated INR (stopped for the past 3 days) Unable to assess ROS due to mental status. Family denies that he had any fevers while at home

## 2024-01-19 NOTE — ED PROVIDER NOTE - PHYSICAL EXAMINATION
----- Message from Talita Sykes MD sent at 11/2/2020  4:27 PM EST -----  Please let patient know that her labs are showing a pregnancy consistent with her dates (2-3 weeks). She has an order to repeat her labs in 5-7 days at [facility].  Thanks.  
Called Jesica Olivares regarding results pt. verbalized understanding for results.    
General: no acute distress   HEENT: normocephalic, atraumatic   Respiratory: normal work of breathing, lungs clear to auscultation bilaterally   Cardiac: regular rate and rhythm   Abdomen: soft, non-tender, no guarding or rebound   Neuro: responsive to aversive stimuli

## 2024-01-19 NOTE — H&P ADULT - PROBLEM SELECTOR PLAN 1
presenting with AMS   AAOx2-3 at home, AAOX0 on exam  CXR showing RLLL consolidation  CT chest: multifocal PNA  appeared more awake and alert when seen at bedside   will start puree diet  S&S consulted presenting with AMS   AAOx2-3 at home, AAOX0-1 on admission  CTH negative  CXR showing RLLL consolidation  CT AP: multifocal PNA  appeared more awake and alert when seen at bedside   will start puree diet  S&S consulted

## 2024-01-19 NOTE — H&P ADULT - NSHPPHYSICALEXAM_GEN_ALL_CORE
General - NAD, lying in bed, appears confused  Eyes - PERRLA, EOM intact  ENT - Nonicteric sclerae, PERRLA, EOMI. Oropharynx clear. Dry mucous membranes. Conjunctivae appear well perfused.   Neck - No noticeable or palpable swelling, redness or rash around throat or on face  Lymph Nodes - No lymphadenopathy  Cardiovascular - irregular rate and rhythm no m/r/g, no JVD, no carotid bruits  Lungs - (+) b/l rhonchi heard on auscultation. No use of accessory muscles, no crackles or wheezes.  Skin - No rashes, skin warm and dry, no erythematous areas  Abdomen - Normal bowel sounds, abdomen soft and nontender  Rectal – Rectal exam not performed since no symptoms indicated blood loss.  Extremities - No edema, cyanosis or clubbing  Musculoskeletal - Unable to assess strength due to mental status. normal range of motion, no swollen or erythematous joints.  Neuro– Alert and oriented x 0, CN 2-12 grossly intact.

## 2024-01-19 NOTE — ED ADULT NURSE NOTE - OBJECTIVE STATEMENT
As per family members pt. has been constipated for 2 days, and decline in mental status over the past week. Ambulates with assistance, advance dementia but definitely a change in mental status

## 2024-01-19 NOTE — ED PROVIDER NOTE - CLINICAL SUMMARY MEDICAL DECISION MAKING FREE TEXT BOX
89-year-old male presenting with altered mental status.  Son reports severe dementia at baseline and patient does not recommends any better cannot converse normally but is usually much more awake and alert and able to assist in some of his care.  Concern for worsening dementia versus infectious etiology causing change in mental status.  Discussed with son at bedside whether patient would need placement in nursing home and he reports he is inclined to have the patient on hospice care but needs to speak with his brother who is the patient's primary caretaker.    [facility] MRN: [id number] 89-year-old male presenting with altered mental status.  Son reports severe dementia at baseline and patient does not recommends any better cannot converse normally but is usually much more awake and alert and able to assist in some of his care.  Concern for worsening dementia versus infectious etiology causing change in mental status.  Discussed with son at bedside whether patient would need placement in nursing home and he reports he is inclined to have the patient on hospice care but needs to speak with his brother who is the patient's primary caretaker.    [facility] MRN: [id number]    patient with right sided PNA on my review of the xray images. family updated on results. will admit. patient with irregular rate on monitor. informed by [employer] tech that monitor is double reading the paced rhythm.

## 2024-01-19 NOTE — ED PROVIDER NOTE - OBJECTIVE STATEMENT
89M, pmh of afib on warfarin, cardiac pacemaker, TAVR, dementia, presenting with altered mental status.  History provided by patient's son at bedside.  For the past week the patient has been more lethargic and not responding like normal.  Sick constipation for the past 2 days.  No fever or vomiting.  Son reports the patient appears to have been urinating normally but he does have urinary incontinence at baseline.

## 2024-01-19 NOTE — H&P ADULT - PROBLEM SELECTOR PLAN 3
INR on admission 6.99  on coumadin 2.5 qd at home for Afib  coumadin held for past 3 days as per family  vitamin k po 5 given in ED > 7.13 > vit K 5 IV given  monitor INR

## 2024-01-20 NOTE — ED ADULT NURSE REASSESSMENT NOTE - NS ED NURSE REASSESS COMMENT FT1
7 am pt resting now , bear huggar in progress , telemonitoring  in progress , daughter at bedside . report given to day shift rn

## 2024-01-21 NOTE — CHART NOTE - NSCHARTNOTEFT_GEN_A_CORE
Notified by RN that patient is hypoglycemic  Patient seen and examined at bedside      CAPILLARY BLOOD GLUCOSE  POCT Blood Glucose.: 40 mg/dL (21 Jan 2024 10:23)  POCT Blood Glucose.: 40 mg/dL (21 Jan 2024 10:23) Notified by RN that patient is hypoglycemic  Patient seen and examined at bedside      Vital Signs Last 24 Hrs  T(C): 36.3 (21 Jan 2024 05:04), Max: 36.4 (20 Jan 2024 16:09)  T(F): 97.3 (21 Jan 2024 05:04), Max: 97.6 (20 Jan 2024 23:00)  HR: 68 (21 Jan 2024 05:04) (68 - 113)  BP: 123/68 (21 Jan 2024 05:04) (108/62 - 138/68)  BP(mean): --  RR: 18 (21 Jan 2024 05:04) (17 - 18)  SpO2: 93% (21 Jan 2024 05:04) (93% - 100%)      CAPILLARY BLOOD GLUCOSE  POCT Blood Glucose.: 40 mg/dL (21 Jan 2024 10:23)  POCT Blood Glucose.: 40 mg/dL (21 Jan 2024 10:23)    Neuro:   Unable to assess due to assessment  Pulm:   clear breath sounds bilaterally,  no rales, wheezes or rhonchi  Cardiac:  irregular rhythm,  normal rate  Abd:  concave,  soft, non tender.  +BS  Patient laying calm, becomes agitated with any tactile stimulation    89 year old male with a medical history of Afib on coumadin, Dementia, AS s/p TAVR.  Patient was admitted with FTT and multifocal pneumonia.  Patient is getting treatment with IV antibiotics and IVF, he is currently NPO due to a failed dysphagia screen.   Patient noted to have a blood sugar of 40 from his chemistry as well as fingersticks.    -   25 grams of D50 given x 1 dose  -   IVF changed from NS to D5-0.45%NS @ 80cc/hr given Sodium of 147 this morning and hypoglycemic episode  -   Repeat blood sugar   -   Patient at baseline mental status  -   Patient DNR / DNI Notified by RN that patient is hypoglycemic  Patient seen and examined at bedside      Vital Signs Last 24 Hrs  T(C): 36.3 (21 Jan 2024 05:04), Max: 36.4 (20 Jan 2024 16:09)  T(F): 97.3 (21 Jan 2024 05:04), Max: 97.6 (20 Jan 2024 23:00)  HR: 68 (21 Jan 2024 05:04) (68 - 113)  BP: 123/68 (21 Jan 2024 05:04) (108/62 - 138/68)  BP(mean): --  RR: 18 (21 Jan 2024 05:04) (17 - 18)  SpO2: 93% (21 Jan 2024 05:04) (93% - 100%)      CAPILLARY BLOOD GLUCOSE  POCT Blood Glucose.: 40 mg/dL (21 Jan 2024 10:23)  POCT Blood Glucose.: 40 mg/dL (21 Jan 2024 10:23)    Neuro:   Unable to assess due to assessment  Pulm:   clear breath sounds bilaterally,  no rales, wheezes or rhonchi  Cardiac:  irregular rhythm,  normal rate  Abd:  concave,  soft, non tender.  +BS  Patient laying calm, becomes agitated with any tactile stimulation    89 year old male with a medical history of Afib on coumadin, Dementia, AS s/p TAVR.  Patient was admitted with FTT and multifocal pneumonia.  Patient is getting treatment with IV antibiotics and IVF, he is currently NPO due to a failed dysphagia screen.   Patient noted to have a blood sugar of 40 from his chemistry as well as fingersticks.    -   25 grams of D50 given x 1 dose  -   IVF changed from NS to D5-0.45%NS @ 80cc/hr given Sodium of 147 this morning and hypoglycemic episode  -   Repeat blood sugar   -   Patient at baseline mental status  -   Patient DNR / DNI  -   Repeat

## 2024-01-21 NOTE — CHART NOTE - NSCHARTNOTEFT_GEN_A_CORE
EVENT: Received telephone call from RN that pt is very agitated & combative towards nursing staff    HPI: 89M PMH Afib on coumadin, PPM, TAVR, dementia presenting with AMS for the past several days admitted for acute encephalopathy 2/2 PNA      SUBJECTIVE: "Go away from here"    OBJECTIVE:  Vital Signs Last 24 Hrs  T(C): 36.3 (21 Jan 2024 01:00), Max: 36.6 (20 Jan 2024 08:07)  T(F): 97.3 (21 Jan 2024 01:00), Max: 97.9 (20 Jan 2024 08:07)  HR: 113 (21 Jan 2024 01:00) (70 - 113)  BP: 138/68 (21 Jan 2024 01:00) (106/63 - 138/68)  BP(mean): --  RR: 18 (21 Jan 2024 01:00) (17 - 18)  SpO2: 95% (21 Jan 2024 01:00) (94% - 100%)    Parameters below as of 21 Jan 2024 01:00  Patient On (Oxygen Delivery Method): room air        FOCUSED PHYSICAL EXAM:  Neuro: awake, agitated & combative  Cardiovascular: Pulses +2 B/L in lower and upper extremities, HR regular, BP stable, No edema.  Respiratory: Respirations regular, unlabored, breath sounds clear B/L.   GI: Abdomen soft, non-tender, positive bowel sounds.  : no bladder distention noted. No complaints at this time.  Skin: Stage 1 pressure ulcer on left buttock      LABS:                        10.4   4.74  )-----------( 210      ( 20 Jan 2024 05:07 )             32.3     01-20    145  |  112<H>  |  34<H>  ----------------------------<  106<H>  3.7   |  29  |  1.09    Ca    8.3<L>      20 Jan 2024 05:07  Phos  3.2     01-20  Mg     1.9     01-20    TPro  6.5  /  Alb  2.6<L>  /  TBili  0.7  /  DBili  x   /  AST  32  /  ALT  27  /  AlkPhos  158<H>  01-19      EKG:   IMAGING:    ASSESSMENT/PROBLEM: Agitation most likely 2/2 to dementia      PLAN:   1. Haldol 2 mg, IM x 1 dose ordered  2. Monitor response to treatment  3. Cont present care/treatment  4. Supportive care

## 2024-01-21 NOTE — PATIENT PROFILE ADULT - FALL HARM RISK - HARM RISK INTERVENTIONS

## 2024-01-22 PROBLEM — N40.0 BENIGN PROSTATIC HYPERPLASIA WITHOUT LOWER URINARY TRACT SYMPTOMS: Chronic | Status: ACTIVE | Noted: 2023-01-01

## 2024-01-22 PROBLEM — I10 ESSENTIAL (PRIMARY) HYPERTENSION: Chronic | Status: ACTIVE | Noted: 2023-01-01

## 2024-01-22 PROBLEM — I48.91 UNSPECIFIED ATRIAL FIBRILLATION: Chronic | Status: ACTIVE | Noted: 2023-01-01

## 2024-01-22 PROBLEM — K40.90 UNILATERAL INGUINAL HERNIA, WITHOUT OBSTRUCTION OR GANGRENE, NOT SPECIFIED AS RECURRENT: Chronic | Status: ACTIVE | Noted: 2023-01-01

## 2024-01-22 NOTE — PROGRESS NOTE ADULT - SUBJECTIVE AND OBJECTIVE BOX
[facility]  Patient is a 89y old  Male who presents with a chief complaint of acute encephalopathy, PNA (20 Jan 2024 15:58)      SUBJECTIVE / OVERNIGHT EVENTS:  No acute events over night. Patient more alert but still confused and A/Ox0~1. Unable to obtain accurate ROS due AMS.      MEDICATIONS  (STANDING):  azithromycin  IVPB 500 milliGRAM(s) IV Intermittent every 24 hours  bisacodyl Suppository 10 milliGRAM(s) Rectal every 24 hours  cefTRIAXone   IVPB 1000 milliGRAM(s) IV Intermittent every 24 hours  dextrose 5% + sodium chloride 0.45%. 1000 milliLiter(s) (80 mL/Hr) IV Continuous <Continuous>  dextrose 50% Injectable 25 Gram(s) IV Push once  enoxaparin Injectable 60 milliGRAM(s) SubCutaneous every 12 hours  tamsulosin 0.4 milliGRAM(s) Oral at bedtime    MEDICATIONS  (PRN):  acetaminophen     Tablet .. 650 milliGRAM(s) Oral every 6 hours PRN Temp greater or equal to 38C (100.4F), Mild Pain (1 - 3)  aluminum hydroxide/magnesium hydroxide/simethicone Suspension 30 milliLiter(s) Oral every 4 hours PRN Dyspepsia  melatonin 3 milliGRAM(s) Oral at bedtime PRN Insomnia  ondansetron Injectable 4 milliGRAM(s) IV Push every 8 hours PRN Nausea and/or Vomiting          OBJECTIVE:  Vital Signs Last 24 Hrs  T(C): 36.3 (21 Jan 2024 05:04), Max: 36.4 (20 Jan 2024 16:09)  T(F): 97.3 (21 Jan 2024 05:04), Max: 97.6 (20 Jan 2024 23:00)  HR: 68 (21 Jan 2024 05:04) (68 - 113)  BP: 123/68 (21 Jan 2024 05:04) (108/62 - 138/68)  BP(mean): --  RR: 18 (21 Jan 2024 05:04) (17 - 18)  SpO2: 93% (21 Jan 2024 05:04) (93% - 100%)    Parameters below as of 21 Jan 2024 05:04  Patient On (Oxygen Delivery Method): room air      PHYSICAL EXAM:  GENERAL: NAD, well-developed  HEAD:  Atraumatic, Normocephalic  EYES: conjunctiva and sclera clear  NECK: Supple, No JVD  CHEST/LUNG: Clear to auscultation bilaterally; No wheeze  HEART: Regular rate and rhythm; No murmurs, rubs, or gallops  ABDOMEN: Soft, Nontender, Nondistended; Bowel sounds present  EXTREMITIES:  No clubbing, cyanosis, or edema  PSYCH: AAOx0-1  NEUROLOGY: non-focal  SKIN: No rashes or lesions    CAPILLARY BLOOD GLUCOSE      POCT Blood Glucose.: 123 mg/dL (21 Jan 2024 11:33)  POCT Blood Glucose.: 40 mg/dL (21 Jan 2024 10:23)    I&O's Summary            LABS:                        11.6   6.44  )-----------( 246      ( 21 Jan 2024 07:23 )             37.3     01-21    147<H>  |  117<H>  |  36<H>  ----------------------------<  46<LL>  3.8   |  24  |  1.30    Ca    8.8      21 Jan 2024 07:23  Phos  3.2     01-20  Mg     1.9     01-20    TPro  5.9<L>  /  Alb  2.3<L>  /  TBili  0.6  /  DBili  x   /  AST  35  /  ALT  31  /  AlkPhos  177<H>  01-21    PT/INR - ( 21 Jan 2024 07:23 )   PT: 15.7 sec;   INR: 1.39 ratio         PTT - ( 20 Jan 2024 05:07 )  PTT:72.5 sec      Urinalysis Basic - ( 21 Jan 2024 07:23 )    Color: x / Appearance: x / SG: x / pH: x  Gluc: 46 mg/dL / Ketone: x  / Bili: x / Urobili: x   Blood: x / Protein: x / Nitrite: x   Leuk Esterase: x / RBC: x / WBC x   Sq Epi: x / Non Sq Epi: x / Bacteria: x          Culture - Urine (collected 19 Jan 2024 18:50)  Source: Clean Catch Clean Catch (Midstream)  Final Report (20 Jan 2024 18:45):    <10,000 CFU/mL Normal Urogenital Juliana    Culture - Blood (collected 19 Jan 2024 17:25)  Source: .Blood Blood-Peripheral  Preliminary Report (20 Jan 2024 23:02):    No growth at 24 hours    Culture - Blood (collected 19 Jan 2024 17:15)  Source: .Blood Blood-Peripheral  Preliminary Report (20 Jan 2024 23:02):    No growth at 24 hours        RADIOLOGY & ADDITIONAL TESTS:      
[facility]  Patient is a 89y old  Male who presents with a chief complaint of acute encephalopathy, PNA (19 Jan 2024 19:58)      SUBJECTIVE / OVERNIGHT EVENTS:  No acute events over night. A/Ox1 unable to accurate give ROS but patient denies being in pain and appears comfortable.      MEDICATIONS  (STANDING):  azithromycin  IVPB 500 milliGRAM(s) IV Intermittent every 24 hours  cefTRIAXone   IVPB 1000 milliGRAM(s) IV Intermittent every 24 hours  sodium chloride 0.9%. 1000 milliLiter(s) (80 mL/Hr) IV Continuous <Continuous>  tamsulosin 0.4 milliGRAM(s) Oral at bedtime    MEDICATIONS  (PRN):  acetaminophen     Tablet .. 650 milliGRAM(s) Oral every 6 hours PRN Temp greater or equal to 38C (100.4F), Mild Pain (1 - 3)  aluminum hydroxide/magnesium hydroxide/simethicone Suspension 30 milliLiter(s) Oral every 4 hours PRN Dyspepsia  melatonin 3 milliGRAM(s) Oral at bedtime PRN Insomnia  ondansetron Injectable 4 milliGRAM(s) IV Push every 8 hours PRN Nausea and/or Vomiting          OBJECTIVE:  Vital Signs Last 24 Hrs  T(C): 36.6 (20 Jan 2024 11:15), Max: 37 (19 Jan 2024 17:33)  T(F): 97.8 (20 Jan 2024 11:15), Max: 98.6 (19 Jan 2024 17:33)  HR: 70 (20 Jan 2024 11:15) (70 - 82)  BP: 106/63 (20 Jan 2024 11:15) (106/63 - 129/78)  BP(mean): --  RR: 17 (20 Jan 2024 11:15) (17 - 18)  SpO2: 98% (20 Jan 2024 11:15) (89% - 100%)    Parameters below as of 20 Jan 2024 11:15  Patient On (Oxygen Delivery Method): nasal cannula  O2 Flow (L/min): 4    PHYSICAL EXAM:  General - NAD, lying in bed, appears confused  Eyes - PERRLA, EOM intact  ENT - Nonicteric sclerae, PERRLA, EOMI. Oropharynx clear. Dry mucous membranes. Conjunctivae appear well perfused.   Neck - No noticeable or palpable swelling, redness or rash around throat or on face  Lymph Nodes - No lymphadenopathy  Cardiovascular - irregular rate and rhythm no m/r/g, no JVD, no carotid bruits  Lungs - (+) b/l rhonchi heard on auscultation. No use of accessory muscles, no crackles or wheezes.  Skin - No rashes, skin warm and dry, no erythematous areas  Abdomen - Normal bowel sounds, abdomen soft and nontender  Rectal – Rectal exam not performed since no symptoms indicated blood loss.  Extremities - No edema, cyanosis or clubbing  Musculoskeletal - Unable to assess strength due to mental status. normal range of motion, no swollen or erythematous joints.  Neuro– Alert and oriented x 1, CN 2-12 grossly intact.    CAPILLARY BLOOD GLUCOSE      POCT Blood Glucose.: 66 mg/dL (19 Jan 2024 17:44)    I&O's Summary            LABS:                        10.4   4.74  )-----------( 210      ( 20 Jan 2024 05:07 )             32.3     01-20    145  |  112<H>  |  34<H>  ----------------------------<  106<H>  3.7   |  29  |  1.09    Ca    8.3<L>      20 Jan 2024 05:07  Phos  3.2     01-20  Mg     1.9     01-20    TPro  6.5  /  Alb  2.6<L>  /  TBili  0.7  /  DBili  x   /  AST  32  /  ALT  27  /  AlkPhos  158<H>  01-19    PT/INR - ( 20 Jan 2024 05:07 )   PT: 76.8 sec;   INR: 7.13 ratio         PTT - ( 20 Jan 2024 05:07 )  PTT:72.5 sec      Urinalysis Basic - ( 20 Jan 2024 05:07 )    Color: x / Appearance: x / SG: x / pH: x  Gluc: 106 mg/dL / Ketone: x  / Bili: x / Urobili: x   Blood: x / Protein: x / Nitrite: x   Leuk Esterase: x / RBC: x / WBC x   Sq Epi: x / Non Sq Epi: x / Bacteria: x            RADIOLOGY & ADDITIONAL TESTS:      
NP Note discussed with  Primary Attending    INTERVAL HPI/OVERNIGHT EVENTS: seen at bedside, pt remains lethargic, opens eyes and screams occasionally.   Spoke with family at bedside, discussed plan of care, updated test result.     MEDICATIONS  (STANDING):  bisacodyl Suppository 10 milliGRAM(s) Rectal every 24 hours  cefTRIAXone   IVPB 1000 milliGRAM(s) IV Intermittent every 24 hours  dextrose 5% + sodium chloride 0.45%. 1000 milliLiter(s) (80 mL/Hr) IV Continuous <Continuous>  dextrose 50% Injectable 25 Gram(s) IV Push once  enoxaparin Injectable 60 milliGRAM(s) SubCutaneous every 12 hours  potassium chloride  10 mEq/100 mL IVPB 10 milliEquivalent(s) IV Intermittent every 1 hour  tamsulosin 0.4 milliGRAM(s) Oral at bedtime    MEDICATIONS  (PRN):  acetaminophen     Tablet .. 650 milliGRAM(s) Oral every 6 hours PRN Temp greater or equal to 38C (100.4F), Mild Pain (1 - 3)  aluminum hydroxide/magnesium hydroxide/simethicone Suspension 30 milliLiter(s) Oral every 4 hours PRN Dyspepsia  LORazepam   Injectable 1 milliGRAM(s) IV Push every 4 hours PRN Agitation  melatonin 3 milliGRAM(s) Oral at bedtime PRN Insomnia  ondansetron Injectable 4 milliGRAM(s) IV Push every 8 hours PRN Nausea and/or Vomiting      __________________________________________________  REVIEW OF SYSTEMS:  unable due to lethargy.       Vital Signs Last 24 Hrs  T(C): 36.2 (22 Jan 2024 05:05), Max: 36.3 (21 Jan 2024 15:54)  T(F): 97.2 (22 Jan 2024 05:05), Max: 97.4 (21 Jan 2024 15:54)  HR: 73 (22 Jan 2024 05:05) (69 - 73)  BP: 131/69 (22 Jan 2024 05:05) (125/70 - 146/75)  BP(mean): --  RR: 17 (22 Jan 2024 05:05) (17 - 18)  SpO2: 95% (22 Jan 2024 05:05) (95% - 100%)    Parameters below as of 22 Jan 2024 05:05  Patient On (Oxygen Delivery Method): room air        ________________________________________________  PHYSICAL EXAM:  GENERAL: NAD, Cachectic,  ill appearing  HEENT: Normocephalic;  conjunctivae and sclerae clear; moist mucous membranes;   NECK : supple  CHEST/LUNG: b/l rhonchi, poor expiratory effort.   HEART: S1 S2  regular; no murmurs, gallops or rubs  ABDOMEN: Soft, Nontender, Nondistended; Bowel sounds present  EXTREMITIES: no cyanosis; no edema; no calf tenderness  SKIN: warm and dry; no rash  NERVOUS SYSTEM:  Lethargic. responds tactile stimuli.     _________________________________________________  LABS:                        11.8   5.34  )-----------( 251      ( 22 Jan 2024 07:23 )             37.3     01-22    148<H>  |  118<H>  |  31<H>  ----------------------------<  125<H>  3.3<L>   |  25  |  1.15    Ca    8.9      22 Jan 2024 07:23    TPro  5.9<L>  /  Alb  2.3<L>  /  TBili  0.6  /  DBili  x   /  AST  35  /  ALT  31  /  AlkPhos  177<H>  01-21    PT/INR - ( 22 Jan 2024 07:49 )   PT: 14.4 sec;   INR: 1.27 ratio           Urinalysis Basic - ( 22 Jan 2024 07:23 )    Color: x / Appearance: x / SG: x / pH: x  Gluc: 125 mg/dL / Ketone: x  / Bili: x / Urobili: x   Blood: x / Protein: x / Nitrite: x   Leuk Esterase: x / RBC: x / WBC x   Sq Epi: x / Non Sq Epi: x / Bacteria: x      CAPILLARY BLOOD GLUCOSE      POCT Blood Glucose.: 110 mg/dL (22 Jan 2024 07:47)  POCT Blood Glucose.: 124 mg/dL (21 Jan 2024 21:23)        RADIOLOGY & ADDITIONAL TESTS:    Imaging  Reviewed:  YES    < from: CT Abdomen and Pelvis w/ IV Cont (01.19.24 @ 19:26) >  IMPRESSION:  Likely multifocal pneumonia. Follow-up to resolution is advised.  Low-attenuation lesions in the liver cannot be further characterized on   this exam due to streak artifacts from patient's arms. These can be   reevaluated at the time of follow-up chest CT.    < end of copied text >  < from: Xray Chest 1 View- PORTABLE-Urgent (01.19.24 @ 17:56) >    ACC: [id number] EXAM:  XR CHEST PORTABLE URGENT 1V   ORDERED BY: CHERIE CARTAGENA     PROCEDURE DATE:  01/19/2024          INTERPRETATION:  AP chest on January 19, 2024 at 5:50 PM. Patient has   sepsis.    COMPARISON: None available.    Heart likely enlarged. Left-sided dual-chamber pacemaker is noted. TAVR   aortic valve is noted.    COPD hyperexpansion of the lungs is noted.    There is a right base infiltrate.    IMPRESSION: Heart enlargement and cardiac devices. COPD. Right base   infiltrate.    --- End of Report ---            ELVIN HARKINS MD; Attending Radiologist  This document has been electronically signed. Jan 19 2024  6:04PM    < end of copied text >  < from: CT Head No Cont (01.19.24 @ 19:25) >    IMPRESSION:  No acute intracranial hemorrhage.  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  Nonspecific right mastoid effusion.  Notification to clinician of alert:  Dr. Cherie Cartagena was notified about the colloid cyst at 8:00 PM on   1/19/2024 with readback confirmation. The opportunity for questions was   provided and all questions asked were answered.    < end of copied text >    Consultant(s) Notes Reviewed:   YES      Plan of care was discussed with patient and /or primary care giver; all questions and concerns were addressed

## 2024-01-22 NOTE — SWALLOW BEDSIDE ASSESSMENT ADULT - SWALLOW EVAL: DIAGNOSIS
Pt p/w oropharyngeal dysphagia c/b poor labial seal, poor bolus manipulation, absence of mastication, decreased A-P transport, and delayed swallow trigger. No overt s&s of penetration/aspiration observed during this evaluation. Comfort care measures were given by REYNOLD Joya and REYNOLD Toro d/t high risk of aspiration and complex medical conditions.

## 2024-01-22 NOTE — SWALLOW BEDSIDE ASSESSMENT ADULT - SLP PERTINENT HISTORY OF CURRENT PROBLEM
As per chart review, pt is a 89M PMH Afib on coumadin, PPM, TAVR, dementia presenting with AMS for the past several days. Family at bedside states that he lives with his son and ambulates with a walker. but he's noticed that the patient has not been moving around less and has been confused for the past few days. He has also been having a dry cough, chills, and has had incontinence. He was on coumadin for Afib, however his PCP told him to stop taking it due to elevated INR (stopped for the past 3 days) Unable to assess ROS due to mental status. Family denies that he had any fevers while at home.

## 2024-01-22 NOTE — PROGRESS NOTE ADULT - PROBLEM SELECTOR PLAN 4
h/o afib on coumadin  holding coumadin for now due to supratherapeutic INR  Now INR 1.27  c/w FD Lovenox as pt not tolerating PO.

## 2024-01-22 NOTE — SWALLOW BEDSIDE ASSESSMENT ADULT - SWALLOW EVAL: RECOMMENDED DIET
Puree/thin liquids; Feeding for QOL: offer foods/drinks as per recommended safe textures when Pt is willing & receptive to feeding. DO NOT FEED IF AGGRESSIVE MEASURES ARE NEEDED. DO NOT PUSH FEED.

## 2024-01-22 NOTE — PROGRESS NOTE ADULT - PROBLEM SELECTOR PLAN 8
Wound care RN followed for PI heels, L Gluteus   wound care reccs:  -There is evidence of mottling to the Bilateral Feet  -There is a healing Stage 2 Pressure injury to the L. Gluteus (0.3cm x 0.3cm x 0.1cm) with pink tissue and scant drainage  -There is a Stage 1 Pressure Injury to the Bilateral Heels, as evident by non-blanchable erythema  -There is evidence of hyperpigmentation to the Bilateral Gluteus and Coccyx areas    · Recommendations  -Clean the L. Gluteal wound with normal saline and apply skin prep to the surrounding skin  -Apply a Foam dressing to the wound bed Q 72hrs PRN  -Elevate/float the patients heels using heel protectors and reposition the patient Q 2hrs using wedges or pillows K-3.3  give IV K rider, not tolerate PO  No more labs  Comfort care

## 2024-01-22 NOTE — CONSULT NOTE ADULT - PROBLEM SELECTOR RECOMMENDATION 2
Clinical evidence indicates that the patient has Severe protein calorie malnutrition/ 3rd degree. Albumin 2.3.      In context of   Chronic Illness (>1 month)    Energy/Food intake <50% of estimated energy requirement >5 days  Weight loss: Moderate - severe (lbs lost recently)  Body Fat loss: Severe   (Cachexia, temporal wasting, contracted, muscle atrophy)  Muscle mass loss: Severe  (Skin failure/pressure ulcers)  Fluid Accumulation: Severe (Fluid overload, ascites, pleural effusions)   Strength: weakened severe (bedbound)    Recommend:   pleasure feeds as tolerated - aspiration precautions, careful hand-feeding, teaching to caregivers  nutritional supplements as tolerated, nutrition consult    SLP dee dee noted  No feeding tube

## 2024-01-22 NOTE — PROGRESS NOTE ADULT - PROBLEM SELECTOR PLAN 11
DVT ppx-Lovenox.     DC PLANNING  From home, lives w/ Son  Pt family consider LTC hospice, palliative following, SW.   DNR/DNI, comfort care.

## 2024-01-22 NOTE — CONSULT NOTE ADULT - SUBJECTIVE AND OBJECTIVE BOX
[facility] Geriatric and Palliative Consult Service:  Monse Muna DO: cell ([phone])  Elier Fischer MD: cell ([phone])  Toan Figueroa NP: cell ([phone])   Elliot Salgado SW: cell ([phone])   Indigo Neal NP: via [employer] Teams    Can contact any Palliative Team member via [employer] Teams for consults and questions      HPI:  89M PMH Afib on coumadin, PPM, TAVR, dementia presenting with AMS for the past several days. Family at bedside states that he lives with his son and ambulates with a walker. but he's noticed that the patient has not been moving around less and has been confused for the past few days. He has also been having a dry cough, chills, and has had incontinence. He was on coumadin for Afib, however his PCP told him to stop taking it due to elevated INR (stopped for the past 3 days) Unable to assess ROS due to mental status. Family denies that he had any fevers while at home (19 Jan 2024 19:58)      PAST MEDICAL & SURGICAL HISTORY:  HTN (hypertension)      BPH (benign prostatic hyperplasia)      Hernia, inguinal      Afib      History of transcatheter aortic valve replacement (TAVR)      Pacemaker      History of repair of left hip joint          SOCIAL HISTORY:    Admitted from:  home  (with HHA)           assisted living          [facility]   [ none ] Substance abuse, [ none ] Tobacco hx, [ none ] Alcohol hx, [ none ] Home Opioid Hx    FAMILY HISTORY:   unable to obtain from patient due to poor mentation, family unable to give information, see H&P for history  Baseline ADLs (prior to admission):    Allergies    No Known Allergies    Intolerances      Present Symptoms: Mild, Moderate, Severe  Pain:             Location -                               Aggravating factors -             Quality -             Radiation -             Timing-             Severity (0-10 scale):             Minimal acceptable level (0-10 scale):  Fatigue:  Nausea:  Lack of Appetite:   SOB:  Depression:  Anxiety:  Review of Systems: [All others negative or Unable to obtain due to poor mentation]    CPOT:    https://www.sccm.org/getattachment/[email or web address]/Critical-Care-Pain-Observation-Tool-(CPOT)  PAIN AD Score:   [email or web address].pdf (press ctrl +  left click to view)      MEDICATIONS  (STANDING):  bisacodyl Suppository 10 milliGRAM(s) Rectal every 24 hours  cefTRIAXone   IVPB 1000 milliGRAM(s) IV Intermittent every 24 hours  dextrose 5% + sodium chloride 0.45%. 1000 milliLiter(s) (80 mL/Hr) IV Continuous <Continuous>  dextrose 50% Injectable 25 Gram(s) IV Push once  potassium chloride  10 mEq/100 mL IVPB 10 milliEquivalent(s) IV Intermittent every 1 hour  tamsulosin 0.4 milliGRAM(s) Oral at bedtime    MEDICATIONS  (PRN):  acetaminophen     Tablet .. 650 milliGRAM(s) Oral every 6 hours PRN Temp greater or equal to 38C (100.4F), Mild Pain (1 - 3)  aluminum hydroxide/magnesium hydroxide/simethicone Suspension 30 milliLiter(s) Oral every 4 hours PRN Dyspepsia  LORazepam   Injectable 1 milliGRAM(s) IV Push every 4 hours PRN Agitation  melatonin 3 milliGRAM(s) Oral at bedtime PRN Insomnia  ondansetron Injectable 4 milliGRAM(s) IV Push every 8 hours PRN Nausea and/or Vomiting      PHYSICAL EXAM:  Vital Signs Last 24 Hrs  T(C): 36.2 (22 Jan 2024 05:05), Max: 36.3 (21 Jan 2024 15:54)  T(F): 97.2 (22 Jan 2024 05:05), Max: 97.4 (21 Jan 2024 15:54)  HR: 73 (22 Jan 2024 05:05) (69 - 73)  BP: 131/69 (22 Jan 2024 05:05) (125/70 - 146/75)  BP(mean): --  RR: 17 (22 Jan 2024 05:05) (17 - 18)  SpO2: 95% (22 Jan 2024 05:05) (95% - 100%)    Parameters below as of 22 Jan 2024 05:05  Patient On (Oxygen Delivery Method): room air        General: alert  oriented x ____    lethargic distressed cachexia  nonverbal  unarousable verbal    Palliative Performance Scale/Karnofsky Score:  http://npcrc.org/files/news/palliative_performance_scale_ppsv2.pdf    HEENT: no abnormal lesion, dry mouth  ET tube/trach oral lesions:  Lungs: tachypnea/labored breathing, audible excessive secretions  CV: RRR, S1S2, tachycardia  GI: soft non distended non tender  incontinent               PEG/NG/OG tube  constipation  last BM:   : incontinent  oliguria/anuria  krishna  Musculoskeletal: weakness x4 edema x4    ambulatory with assistance   mostly/fully bedbound/wheelchair bound  Skin: no abnormal skin lesions, poor skin turgor, pressure ulcer stage:   Neuro: no deficits, mild cognitive impairment dsyphagia/dysarthria paresis  Oral intake ability: unable/only mouth care, minimal moderate full capability    LABS:                        11.8   5.34  )-----------( 251      ( 22 Jan 2024 07:23 )             37.3     01-22    148<H>  |  118<H>  |  31<H>  ----------------------------<  125<H>  3.3<L>   |  25  |  1.15    Ca    8.9      22 Jan 2024 07:23    TPro  5.9<L>  /  Alb  2.3<L>  /  TBili  0.6  /  DBili  x   /  AST  35  /  ALT  31  /  AlkPhos  177<H>  01-21    Urinalysis Basic - ( 22 Jan 2024 07:23 )    Color: x / Appearance: x / SG: x / pH: x  Gluc: 125 mg/dL / Ketone: x  / Bili: x / Urobili: x   Blood: x / Protein: x / Nitrite: x   Leuk Esterase: x / RBC: x / WBC x   Sq Epi: x / Non Sq Epi: x / Bacteria: x        RADIOLOGY & ADDITIONAL STUDIES:         [facility] Geriatric and Palliative Consult Service:  Monse Toro DO: cell ([phone])  Elier Fischer MD: cell ([phone])  Toan Cartagena NP: cell ([phone])   Elliot Salgado SW: cell ([phone])   Indigo Neal NP: via [employer] Teams    Can contact any Palliative Team member via [employer] Teams for consults and questions      HPI:  89M PMH Afib on coumadin, PPM, TAVR, dementia presenting with AMS for the past several days. Family at bedside states that he lives with his son and ambulates with a walker. but he's noticed that the patient has not been moving around less and has been confused for the past few days. He has also been having a dry cough, chills, and has had incontinence. He was on coumadin for Afib, however his PCP told him to stop taking it due to elevated INR (stopped for the past 3 days) Unable to assess ROS due to mental status. Family denies that he had any fevers while at home (19 Jan 2024 19:58)    Interval hx: Pt seen and examined at the bedside.  Daughter Carolina and son Hammad at the bedside.     PAST MEDICAL & SURGICAL HISTORY:  HTN (hypertension)      BPH (benign prostatic hyperplasia)      Hernia, inguinal      Afib      History of transcatheter aortic valve replacement (TAVR)      Pacemaker      History of repair of left hip joint          SOCIAL HISTORY:    Admitted from:  home with family has HHA 10 hours  Pt has 3 children they medical decisions as a family   [ none ] Substance abuse, [ none ] Tobacco hx, [ none ] Alcohol hx, [ none ] Home Opioid Hx    [employer]: Shinto  Sav Huizar (son)          Phone# [phone]  Carolina Walters  (dtr)  Phone# [phone]  Hammad Huizar (son)    Phone# [phone]    FAMILY HISTORY:   unable to obtain from patient due to poor mentation, family unable to give information, see H&P for history  Baseline ADLs (prior to admission):  Bedbound, total care    Allergies    No Known Allergies    Intolerances      Present Symptoms: Mild, Moderate, Severe   Unable to obtain due to poor mentation    CPOT:    https://www.[email or web address]/getattachment/[email or web address]/Critical-Care-Pain-Observation-Tool-(CPOT)  PAIN AD Score:   [email or web address] (press ctrl +  left click to view)      MEDICATIONS  (STANDING):  bisacodyl Suppository 10 milliGRAM(s) Rectal every 24 hours  cefTRIAXone   IVPB 1000 milliGRAM(s) IV Intermittent every 24 hours  dextrose 5% + sodium chloride 0.45%. 1000 milliLiter(s) (80 mL/Hr) IV Continuous <Continuous>  dextrose 50% Injectable 25 Gram(s) IV Push once  potassium chloride  10 mEq/100 mL IVPB 10 milliEquivalent(s) IV Intermittent every 1 hour  tamsulosin 0.4 milliGRAM(s) Oral at bedtime    MEDICATIONS  (PRN):  acetaminophen     Tablet .. 650 milliGRAM(s) Oral every 6 hours PRN Temp greater or equal to 38C (100.4F), Mild Pain (1 - 3)  aluminum hydroxide/magnesium hydroxide/simethicone Suspension 30 milliLiter(s) Oral every 4 hours PRN Dyspepsia  LORazepam   Injectable 1 milliGRAM(s) IV Push every 4 hours PRN Agitation  melatonin 3 milliGRAM(s) Oral at bedtime PRN Insomnia  ondansetron Injectable 4 milliGRAM(s) IV Push every 8 hours PRN Nausea and/or Vomiting      PHYSICAL EXAM:  Vital Signs Last 24 Hrs  T(C): 36.2 (22 Jan 2024 05:05), Max: 36.3 (21 Jan 2024 15:54)  T(F): 97.2 (22 Jan 2024 05:05), Max: 97.4 (21 Jan 2024 15:54)  HR: 73 (22 Jan 2024 05:05) (69 - 73)  BP: 131/69 (22 Jan 2024 05:05) (125/70 - 146/75)  BP(mean): --  RR: 17 (22 Jan 2024 05:05) (17 - 18)  SpO2: 95% (22 Jan 2024 05:05) (95% - 100%)    Parameters below as of 22 Jan 2024 05:05  Patient On (Oxygen Delivery Method): room air        General: Chronically ill appearing, AOX0, mumbling incoherent speech.     Palliative Performance Scale/Karnofsky Score: 10%  http://npcrc.org/files/news/palliative_performance_scale_ppsv2.pdf    HEENT: temporal wasting, dry MM, neck supple  Lungs: unlabored on RA  CV: RRR, S1S2  GI: soft non distended non tender  incontinent               last BM: 1/22  : incontinent    Musculoskeletal: weakness x4, bedbound, contracted, no edema  Skin: no abnormal skin lesions, poor skin turgor  Neuro: unable to follow commands  Oral intake ability: unable/only mouth care    LABS:                        11.8   5.34  )-----------( 251      ( 22 Jan 2024 07:23 )             37.3     01-22    148<H>  |  118<H>  |  31<H>  ----------------------------<  125<H>  3.3<L>   |  25  |  1.15    Ca    8.9      22 Jan 2024 07:23    TPro  5.9<L>  /  Alb  2.3<L>  /  TBili  0.6  /  DBili  x   /  AST  35  /  ALT  31  /  AlkPhos  177<H>  01-21    Urinalysis Basic - ( 22 Jan 2024 07:23 )    Color: x / Appearance: x / SG: x / pH: x  Gluc: 125 mg/dL / Ketone: x  / Bili: x / Urobili: x   Blood: x / Protein: x / Nitrite: x   Leuk Esterase: x / RBC: x / WBC x   Sq Epi: x / Non Sq Epi: x / Bacteria: x    < from: CT Abdomen and Pelvis w/ IV Cont (01.19.24 @ 19:26) >  ACC: [id number] EXAM:  CT ABDOMEN AND PELVIS IC   ORDERED BY: SANDRA CARTAGENA     PROCEDURE DATE:  01/19/2024          INTERPRETATION:  CLINICAL INFORMATION: Constipation and altered mental   status.    COMPARISON: None.    CONTRAST/COMPLICATIONS:  IV Contrast: Omnipaque 350  90 cc administered   10 cc discarded  Oral Contrast: NONE  Complications: None reported at time of study completion    PROCEDURE:  CT of the Abdomen and Pelvis was performed.  Sagittal and coronal reformats were performed.    FINDINGS: Evaluation is suboptimal due to streak artifacts from patient's   arms.  LOWER CHEST: Cardiomegaly, aortic valve repair, ICD leads in right atrium   and right ventricle. Patchy opacities in the visualized lung bases   bilaterally.    LIVER: Multiple low-attenuation lesions in the liver are indeterminate, a   representative lesion measures 8mm in segment 7 (2, 46).  BILE DUCTS: Normal caliber.  GALLBLADDER: Cholelithiasis. Contracted.  SPLEEN: Within normal limits.  PANCREAS: Within normal limits.  ADRENALS: Within normal limits.  KIDNEYS/URETERS: Right renal cysts. Atrophic kidneys without   hydronephrosis.    BLADDER: Within normal limits.  REPRODUCTIVE ORGANS: Prostate is enlarged.    BOWEL: No bowel obstruction. Appendix is normal.  PERITONEUM: No ascites.  VESSELS: Atherosclerotic changes.  RETROPERITONEUM/LYMPH NODES: No lymphadenopathy.  ABDOMINAL WALL: Bilateral inguinal hernias containing large bowel.  BONES: Degenerative changes. Scoliosis. Left hip ORIF. Likely prior  compression deformity of L1. Mild osteopenia.    IMPRESSION:  Likely multifocal pneumonia. Follow-up to resolution is advised.  Low-attenuation lesions in the liver cannot be further characterized on   this exam due to streak artifacts from patient's arms. These can be   reevaluated at the time of follow-up chest CT.    < end of copied text >      RADIOLOGY & ADDITIONAL STUDIES: reviewed  ADVANCED DEMENTIA: MOLST: DNR/DNI/no feeding tube/comfort measures only/DNH.

## 2024-01-22 NOTE — PROGRESS NOTE ADULT - NS ATTEND AMEND GEN_ALL_CORE FT
89M, pmh of chronic afib on warfarin, cardiac pacemaker, TAVR, dementia, presenting with altered mental status. Patient with baseline dementia but patient is more alert at baseline. Currently A/Ox0, lethargic. Admit for AHRF and Encephalopathy 2/2 R PNA, and supratherapeutic INR.    #AHRF and Encephalopathy 2/2 multifocal PNA, possible aspiration  #Supratherapeutic INR  #Dementia  #Chronic Afib, cardiac pacemaker, TAVR on warfarin  - Palliative care consulted - Plan for LTC with hospice  - Still very encephalopathic and S/S eval today - will have to be on pleasure feeds  - Now on RA  -c/w Ceftriaxone and azithromycin  - Supratherapeutic INR 6.99, reportedly >7 in clinic 3 days ago and warfarin was held, no signs of bleeding. Vitamin K given on admission. now INR 1.6    -Unable to swallow   - CTH reviewed and showing no hemorrhage, 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 Family report that patient with dementia but much more alert usually but has been getting less alert over the past 3 days. They had questions regarding hospice as patient has been getting harder to take care of at home. Patient has history of sundowning and delerium in the past. Discussed code status for all family agree on DNR/DNI but will like to continue medical care for now and would likely not want any intensive procedures or surgeries. Discussed possible hospice (home vs ltc) and comfort care and family has decided on LTC with hospice. For now agrees to medical care and DNR/DNI.   - Palliative care consult in AM for LTC with hospice 89M, pmh of chronic afib on warfarin, cardiac pacemaker, TAVR, dementia, presenting with altered mental status. Patient with baseline dementia but patient is more alert at baseline. Currently A/Ox0, lethargic. Admit for AHRF and Encephalopathy 2/2 R PNA, and supratherapeutic INR.    #AHRF and Encephalopathy 2/2 multifocal PNA, possible aspiration  #Supratherapeutic INR  #Dementia  #Chronic Afib, cardiac pacemaker, TAVR on warfarin  CTH reviewed and showing no hemorrhage, 4mm colloid cyst at R foramen montro and R mastoid effusion. CT A/P showing multifocal PNA  - Palliative care consulted - DNR/DNI/no feeding tube/comfort measures only/DNH. Plan for LTC with hospice  - Still very encephalopathic and S/S eval today - will have to be on pleasure feeds  - Now on RA  -c/w Ceftriaxone and azithromycin  - Supratherapeutic INR 6.99, reportedly >7 in clinic 3 days ago and warfarin was held, no signs of bleeding. Vitamin K given on admission. now INR 1.6    -Unable to swallow - so on Full dose lovenox after discussion with family  - aspiration precautions

## 2024-01-22 NOTE — ADVANCED PRACTICE NURSE CONSULT - RECOMMEDATIONS
-Clean the L. Gluteal wound with normal saline and apply skin prep to the surrounding skin  -Apply a Foam dressing to the wound bed Q 72hrs PRN  -Elevate/float the patients heels using heel protectors and reposition the patient Q 2hrs using wedges or pillows

## 2024-01-22 NOTE — ADVANCED PRACTICE NURSE CONSULT - ASSESSMENT
This is a 89yr old male patient admitted for Pneumonia, presenting with the following:  -There is evidence of generalized ecchymosis throughout the patients body  -There is evidence of mottling to the Bilateral Feet  -There is a healing Stage 2 Pressure injury to the L. Gluteus (0.3cm x 0.3cm x 0.1cm) with pink tissue and scant drainage  -There is a Stage 1 Pressure Injury to the Bilateral Heels, as evident by non-blanchable erythema  -There is evidence of hyperpigmentation to the Bilateral Gluteus and Coccyx areas
other

## 2024-01-22 NOTE — PROGRESS NOTE ADULT - PROBLEM SELECTOR PLAN 5
DVT ppx-Lovenox.     DC PLANNING  From home, lives w/ Son  Pt family consider LTC hospice, palliative following, SW.   DNR/DNI, comfort care. AOx1   now lethargic  Supportive care.   Skin integrity  Aspiration precaution, T/P q2h INR on admission 6.99  on coumadin 2.5 qd at home for Afib  coumadin held for past 3 days as per family  vitamin k po 5 given in ED > 7.13 > vit K 5 IV given  now INR 1.27  Comfort care  Family wishes AC therapy, not tolerating po  Started FD lovenox.

## 2024-01-22 NOTE — CONSULT NOTE ADULT - CONVERSATION DETAILS
Met with the pt's children Hammad and Carolina at the bedside, his son Sav participated via phone. Discussed the pt's current clinical condition and goals of care.  They endorsed the pt was ambulatory able to dress himself up to last Friday.  Discussed dementia trajectory and provided anticipatory guidance.  Pt is appropriate for hospice.    Explained that swallowing difficulties is common in advancing dementia. The person may have a weak swallow or lose the ability to swallow safely.  Pt with advanced dementia food and fluid intake tends to decrease slowly over time. Discussed the risk/ benefits of artificial nutrition.  PEG does not optimize pt's life but rather prolongs suffering.       Discussed risks/benefits of LST such as CPR/ intubation, artificial nutrition and PEG in the context of advanced dementia and debility. Family aware these invasive measures will not optimize the pt's life but may cause more stress and pain.   MOLST: DNR/DNI/no feeding tube/comfort measures only/DNH.  They wish for the pt to be discharged to a LTC facility with [facility] [place].  SW referral made.  Chaplaincy offered and accepted.    All questions answered.  Support provided.  d/w primary team

## 2024-01-22 NOTE — PROGRESS NOTE ADULT - PROBLEM SELECTOR PLAN 3
INR on admission 6.99  on coumadin 2.5 qd at home for Afib  coumadin held for past 3 days as per family  vitamin k po 5 given in ED > 7.13 > vit K 5 IV given  now INR 1.27  Comfort care  Family wishes AC therapy, not tolerating po  Started FD lovenox. CT and CXR shows PNA, multifocal  started CTX, Zithro  Aspiration precaution  supplemental oxygen.   Comfort care, DNR/DNI

## 2024-01-22 NOTE — PROGRESS NOTE ADULT - PROBLEM SELECTOR PLAN 1
presenting with AMS   AAOx2-3 at home, AAOX0-1 on admission  CTH negative  CXR showing RLLL consolidation  CT AP: multifocal PNA  appeared more awake and alert when seen at bedside   S&S consulted-Failed  comfort feed with puree when awake.   comfort care only  DNR/DNI/No PEG

## 2024-01-22 NOTE — PHARMACOTHERAPY INTERVENTION NOTE - COMMENTS
Patient identified by [facility] for pneumonia. Pertinent medications were reviewed and no additional interventions at this time.
Recommended to discontinue azithromycin in the setting of negative Legionella antigen in a patient being treated for pneumonia. Patient is also currently being treated with ceftriaxone. Patient is afebrile and WBC 6.44.     Rebeca BrockD   Clinical Pharmacy Specialist, Infectious Diseases  Tele-Antimicrobial Stewardship Program (Tele-ASP)  Tele-ASP Phone: [phone]

## 2024-01-22 NOTE — PROGRESS NOTE ADULT - PROBLEM SELECTOR PLAN 6
Pt with PNA, Advanced demenita  SLP failed  comfort care only  c/w comfort feed with puree diet. AOx1   now lethargic  Supportive care.   Skin integrity  Aspiration precaution, T/P q2h  DNR/DNI, NO PEG  Palliative  following  Comfort care.

## 2024-01-22 NOTE — CONSULT NOTE ADULT - PROBLEM SELECTOR RECOMMENDATION 9
Pt is AOX0, bedbound. Total care. No behavior issues.  FAST 7D.  Appropriate for hospice.   Discussed dementia trajectory and provided anticipatory guidance  Educated family about hospice philosophy.     MOLST: DNR/DNI/no feeding tube/comfort measures only/DNH  Family agreed for [facility] w [facility], [facility] [place]

## 2024-01-22 NOTE — SWALLOW BEDSIDE ASSESSMENT ADULT - SLP GENERAL OBSERVATIONS
HOB elevated to 90°. AA+Ox0. Pt awake but mumbling nonsequitor phrases intermittently in Malaysian. Pt daughter at bedside providing Malaysian interpretation. Pt p/w open-mouth posture and oral thrush. Oral hygeine was performed using toothette. Pt edentulous, but daughter expressed that he uses them for eating soft, cut up foods. PO trials of ice chips, thin liquids, and puree were given. Pt asleep intermittently during PO trials. Significant bolus residue present during Puree trial.

## 2024-01-22 NOTE — PROGRESS NOTE ADULT - PROBLEM SELECTOR PLAN 7
Advanced dementia  Supportive care, skin integrity Pt with PNA, Advanced demenita  SLP failed  comfort care only  c/w comfort feed with puree diet.

## 2024-01-22 NOTE — SWALLOW BEDSIDE ASSESSMENT ADULT - PHARYNGEAL PHASE
delayed swallow trigger/Delayed pharyngeal swallow delayed swallow trigger/Delayed pharyngeal swallow/Decreased laryngeal elevation

## 2024-01-22 NOTE — PROGRESS NOTE ADULT - PROBLEM SELECTOR PLAN 10
Wound care RN followed for PI heels, L Gluteus   wound care reccs:  -There is evidence of mottling to the Bilateral Feet  -There is a healing Stage 2 Pressure injury to the L. Gluteus (0.3cm x 0.3cm x 0.1cm) with pink tissue and scant drainage  -There is a Stage 1 Pressure Injury to the Bilateral Heels, as evident by non-blanchable erythema  -There is evidence of hyperpigmentation to the Bilateral Gluteus and Coccyx areas    · Recommendations  -Clean the L. Gluteal wound with normal saline and apply skin prep to the surrounding skin  -Apply a Foam dressing to the wound bed Q 72hrs PRN  -Elevate/float the patients heels using heel protectors and reposition the patient Q 2hrs using wedges or pillows

## 2024-01-22 NOTE — PROGRESS NOTE ADULT - PROBLEM SELECTOR PLAN 2
presenting with AMS  AAOx2-3 at home, AAOX0 on exam  CXR showing RLL consolidation  CT AP: multifocal PNA  98% on 4L NC on admission  RVP (-)  started on rocephin and azithro  supplemental oxygen  DNR/DNI  comfort care

## 2024-01-22 NOTE — PROGRESS NOTE ADULT - ASSESSMENT
89M, pmh of chronic afib on warfarin, cardiac pacemaker, TAVR, dementia, presenting with altered mental status. Patient with baseline dementia but patient is more alert at baseline. Currently A/Ox0, lethargic. Admit for AHRF and Encephalopathy 2/2 R PNA, and supratherapeutic INR.    #AHRF and Encephalopathy 2/2 multifocal PNA, possible aspiration  #Supratherapeutic INR  #Dementia  #Chronic Afib, cardiac pacemaker, TAVR on warfarin  - On 4L NC will downtitrate as tolerate  - Ceftriaxone and azithromycin  - IVF hydration  - viral panel neg, UA with RBC 11  - obtain sputum culture, strep, legionella, mycoplasma  - S/S eval  - Supratherapeutic INR 6.99, reportedly >7 in clinic 3 days ago and warfarin was held, no signs of bleeding  - Vitamin K given on admission, continue to have elevated INR    - Will monitor INR off warfarin, no signs of bleeding at this time    - Monitor CBC  - CTH reviewed and showing no hemorrhage, 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 Family report that patient with dementia but much more alert usually but has been getting less alert over the past 3 days. They had questions regarding hospice as patient has been getting harder to take care of at home. Patient has history of sundowning and delerium in the past. Discussed code status for all family agree on DNR/DNI but will like to continue medical care for now and would likely not want any intensive procedures or surgeries but would like to know first. Discussed possible hospice (home vs ltc) and comfort care and family will discuss and decide. For now agrees to medical care and DNR/DNI.
89M, pmh of chronic afib on warfarin, cardiac pacemaker, TAVR, dementia, presenting with altered mental status. Patient with baseline dementia but patient is more alert at baseline. Currently A/Ox0, lethargic. Admit for AHRF and Encephalopathy 2/2 R PNA, and supratherapeutic INR.    #AHRF and Encephalopathy 2/2 multifocal PNA, possible aspiration  #Supratherapeutic INR  #Dementia  #Chronic Afib, cardiac pacemaker, TAVR on warfarin  - Now on RA  - Ceftriaxone and azithromycin  - IVF hydration  - viral panel neg, UA with RBC 11  - obtain sputum culture, strep, legionella, mycoplasma  - S/S eval  - Supratherapeutic INR 6.99, reportedly >7 in clinic 3 days ago and warfarin was held, no signs of bleeding. Vitamin K given on admission. 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 Family report that patient with dementia but much more alert usually but has been getting less alert over the past 3 days. They had questions regarding hospice as patient has been getting harder to take care of at home. Patient has history of sundowning and delerium in the past. Discussed code status for all family agree on DNR/DNI but will like to continue medical care for now and would likely not want any intensive procedures or surgeries. Discussed possible hospice (home vs ltc) and comfort care and family has decided on LTC with hospice. For now agrees to medical care and DNR/DNI.   - Palliative care consult in AM for LTC with hospice  
89M PMH Afib on coumadin, PPM, TAVR, dementia presenting with AMS for the past several days admitted for acute encephalopathy 2/2 PNA on CT. Failed SLP eval. Palliative consulted. DNR/DNI,no feeding tube. Comfort care only.   Pt INR subtherapeutic. family wishes to continue AC therapy. not tolerating PO. Started FD Lovenox.

## 2024-01-22 NOTE — PROGRESS NOTE ADULT - PROBLEM SELECTOR PLAN 9
DVT ppx-Lovenox.     DC PLANNING  From home, lives w/ Son  Pt family consider LTC hospice, palliative following, SW.   DNR/DNI, comfort care. Advanced dementia  Supportive care, skin integrity

## 2024-01-22 NOTE — CONSULT NOTE ADULT - PROBLEM SELECTOR RECOMMENDATION 3
Pt is bedbound. B/l LE Contracted.  High risk for skin failure.  Supportive care  Freq positioning    Comfort only

## 2024-01-22 NOTE — CONSULT NOTE ADULT - PROBLEM SELECTOR RECOMMENDATION 4
Pt is appropriate for hospice due to advanced dementia, with severe protein malnutrition and debility.  Family agreed for LTC with hospice.  Please see discussion noted above on GOC conversation

## 2024-01-22 NOTE — CONSULT NOTE ADULT - PROBLEM SELECTOR PROBLEM 3
Debility Sski Pregnancy And Lactation Text: This medication is Pregnancy Category D and isn't considered safe during pregnancy. It is excreted in breast milk.

## 2024-01-23 NOTE — PROVIDER CONTACT NOTE (OTHER) - ACTION/TREATMENT ORDERED:
ACP Gayatri rosario the bedside and pronounced pt dead at [id number]. Provider also contact the Donor hotline.

## 2024-01-23 NOTE — PROVIDER CONTACT NOTE (OTHER) - SITUATION
all to see pt. by his son at the bedside. Pt. was found unresponsive. External rub technique was perform. Pt. was DNR/DNI.

## 2024-01-23 NOTE — DISCHARGE NOTE FOR THE EXPIRED PATIENT - HOSPITAL COURSE
89M PMH Afib on coumadin, PPM, TAVR, dementia presenting with AMS for the past several days admitted for acute encephalopathy 2/2 PNA on CT. Failed SLP eval. Pt INR subtherapeutic. family wishes to continue AC therapy. not tolerating PO. Started FD Lovenox.   Palliative consulted. DNR/DNI,no feeding tube. Comfort care only.     Called to see patient for unresponsiveness. On exam the patient did not respond to verbal or physical stimuli. Absent heart and breath sounds. Absent peripheral pulses. Pupils are fixed and dilated. Patient pronounced dead at 09:39am. Dr. Michelle notified. Next of kin/Son Sav notified at bedside. Autopsy declined.   89M PMH Afib on coumadin, PPM, TAVR, dementia presenting with AMS for the past several days admitted for acute encephalopathy 2/2 PNA on CT. Failed SLP eval. Pt INR subtherapeutic. family wishes to continue AC therapy. not tolerating PO. Started FD Lovenox.   Palliative consulted. DNR/DNI,no feeding tube. Comfort care only.     Called to see patient for unresponsiveness. On exam the patient did not respond to verbal or physical stimuli. Absent heart and breath sounds. Absent peripheral pulses. Pupils are fixed and dilated. Patient pronounced dead at 09:39am. Dr. Michelle notified. Next of kin/Son Sav notified at bedside. Autopsy declined.  Organ donation number obtained [id number]. 89M PMH Afib on coumadin, PPM, TAVR, dementia presenting with AMS for the past several days admitted for acute encephalopathy 2/2 PNA on CT. Failed SLP eval. Pt INR subtherapeutic. family wishes to continue AC therapy. not tolerating PO. Started FD Lovenox.   Palliative consulted. DNR/DNI,no feeding tube. Comfort care only.     Called to see patient for unresponsiveness. On exam the patient did not respond to verbal or physical stimuli. Absent heart and breath sounds. Absent peripheral pulses. Pupils are fixed and dilated. Patient pronounced dead at 09:35am. Dr. Michelle notified. Next of kin/Son Sav notified at bedside. Autopsy declined.  Organ donation number obtained [id number].

## 2024-01-23 NOTE — PROVIDER CONTACT NOTE (OTHER) - ASSESSMENT
Pt. was found unresponsive. Son at the bedside emotional support was provide. Chaplaincy care provided  as family requested.

## 2024-01-24 LAB
CULTURE RESULTS: SIGNIFICANT CHANGE UP
CULTURE RESULTS: SIGNIFICANT CHANGE UP
SPECIMEN SOURCE: SIGNIFICANT CHANGE UP
SPECIMEN SOURCE: SIGNIFICANT CHANGE UP